# Patient Record
Sex: FEMALE | URBAN - METROPOLITAN AREA
[De-identification: names, ages, dates, MRNs, and addresses within clinical notes are randomized per-mention and may not be internally consistent; named-entity substitution may affect disease eponyms.]

---

## 2024-01-24 ENCOUNTER — HOSPITAL ENCOUNTER (EMERGENCY)
Age: 49
Discharge: HOME | End: 2024-01-24
Attending: EMERGENCY MEDICINE
Payer: COMMERCIAL

## 2024-01-24 VITALS
TEMPERATURE: 97.7 F | DIASTOLIC BLOOD PRESSURE: 84 MMHG | SYSTOLIC BLOOD PRESSURE: 141 MMHG | HEART RATE: 85 BPM | RESPIRATION RATE: 20 BRPM | OXYGEN SATURATION: 94 %

## 2024-01-24 DIAGNOSIS — R19.7 NAUSEA, VOMITING AND DIARRHEA: ICD-10-CM

## 2024-01-24 DIAGNOSIS — R51.9 OCCIPITAL HEADACHE: Primary | ICD-10-CM

## 2024-01-24 DIAGNOSIS — R11.2 NAUSEA, VOMITING AND DIARRHEA: ICD-10-CM

## 2024-01-24 LAB
ALBUMIN SERPL-MCNC: 4.5 G/DL (ref 3.4–5)
ALP SERPL-CCNC: 111 U/L (ref 40–129)
ALT SERPL-CCNC: 37 U/L (ref 10–40)
ANION GAP SERPL CALCULATED.3IONS-SCNC: 13 MMOL/L (ref 3–16)
AST SERPL-CCNC: 30 U/L (ref 15–37)
BACTERIA URNS QL MICRO: ABNORMAL /HPF
BASOPHILS # BLD: 0.1 K/UL (ref 0–0.2)
BASOPHILS NFR BLD: 1.4 %
BILIRUB DIRECT SERPL-MCNC: <0.2 MG/DL (ref 0–0.3)
BILIRUB INDIRECT SERPL-MCNC: NORMAL MG/DL (ref 0–1)
BILIRUB SERPL-MCNC: 0.5 MG/DL (ref 0–1)
BILIRUB UR QL STRIP.AUTO: NEGATIVE
BUN SERPL-MCNC: 12 MG/DL (ref 7–20)
CALCIUM SERPL-MCNC: 9.9 MG/DL (ref 8.3–10.6)
CHLORIDE SERPL-SCNC: 102 MMOL/L (ref 99–110)
CLARITY UR: CLEAR
CO2 SERPL-SCNC: 23 MMOL/L (ref 21–32)
COLOR UR: YELLOW
CREAT SERPL-MCNC: 0.7 MG/DL (ref 0.6–1.1)
DEPRECATED RDW RBC AUTO: 14.1 % (ref 12.4–15.4)
EOSINOPHIL # BLD: 0.3 K/UL (ref 0–0.6)
EOSINOPHIL NFR BLD: 4 %
EPI CELLS #/AREA URNS HPF: ABNORMAL /HPF (ref 0–5)
GFR SERPLBLD CREATININE-BSD FMLA CKD-EPI: >60 ML/MIN/{1.73_M2}
GLUCOSE SERPL-MCNC: 132 MG/DL (ref 70–99)
GLUCOSE UR STRIP.AUTO-MCNC: 500 MG/DL
HCT VFR BLD AUTO: 45 % (ref 36–48)
HGB BLD-MCNC: 15.1 G/DL (ref 12–16)
HGB UR QL STRIP.AUTO: NEGATIVE
KETONES UR STRIP.AUTO-MCNC: NEGATIVE MG/DL
LEUKOCYTE ESTERASE UR QL STRIP.AUTO: NEGATIVE
LIPASE SERPL-CCNC: 46 U/L (ref 13–60)
LYMPHOCYTES # BLD: 1.7 K/UL (ref 1–5.1)
LYMPHOCYTES NFR BLD: 23.8 %
MCH RBC QN AUTO: 29 PG (ref 26–34)
MCHC RBC AUTO-ENTMCNC: 33.5 G/DL (ref 31–36)
MCV RBC AUTO: 86.5 FL (ref 80–100)
MONOCYTES # BLD: 0.4 K/UL (ref 0–1.3)
MONOCYTES NFR BLD: 6.1 %
NEUTROPHILS # BLD: 4.8 K/UL (ref 1.7–7.7)
NEUTROPHILS NFR BLD: 64.7 %
NITRITE UR QL STRIP.AUTO: NEGATIVE
PH UR STRIP.AUTO: 6 [PH] (ref 5–8)
PLATELET # BLD AUTO: 236 K/UL (ref 135–450)
PMV BLD AUTO: 7.6 FL (ref 5–10.5)
POTASSIUM SERPL-SCNC: 4.1 MMOL/L (ref 3.5–5.1)
PROT SERPL-MCNC: 7.2 G/DL (ref 6.4–8.2)
PROT UR STRIP.AUTO-MCNC: ABNORMAL MG/DL
RBC # BLD AUTO: 5.2 M/UL (ref 4–5.2)
RBC #/AREA URNS HPF: ABNORMAL /HPF (ref 0–4)
SODIUM SERPL-SCNC: 138 MMOL/L (ref 136–145)
SP GR UR STRIP.AUTO: 1.02 (ref 1–1.03)
UA DIPSTICK W REFLEX MICRO PNL UR: YES
URN SPEC COLLECT METH UR: ABNORMAL
UROBILINOGEN UR STRIP-ACNC: 0.2 E.U./DL
WBC # BLD AUTO: 7.4 K/UL (ref 4–11)
WBC #/AREA URNS HPF: ABNORMAL /HPF (ref 0–5)

## 2024-01-24 PROCEDURE — 83690 ASSAY OF LIPASE: CPT

## 2024-01-24 PROCEDURE — 96361 HYDRATE IV INFUSION ADD-ON: CPT

## 2024-01-24 PROCEDURE — 6370000000 HC RX 637 (ALT 250 FOR IP): Performed by: PHYSICIAN ASSISTANT

## 2024-01-24 PROCEDURE — 99284 EMERGENCY DEPT VISIT MOD MDM: CPT

## 2024-01-24 PROCEDURE — 96375 TX/PRO/DX INJ NEW DRUG ADDON: CPT

## 2024-01-24 PROCEDURE — 80076 HEPATIC FUNCTION PANEL: CPT

## 2024-01-24 PROCEDURE — 96374 THER/PROPH/DIAG INJ IV PUSH: CPT

## 2024-01-24 PROCEDURE — 80048 BASIC METABOLIC PNL TOTAL CA: CPT

## 2024-01-24 PROCEDURE — 36415 COLL VENOUS BLD VENIPUNCTURE: CPT

## 2024-01-24 PROCEDURE — 6360000002 HC RX W HCPCS: Performed by: PHYSICIAN ASSISTANT

## 2024-01-24 PROCEDURE — 2580000003 HC RX 258: Performed by: PHYSICIAN ASSISTANT

## 2024-01-24 PROCEDURE — 81001 URINALYSIS AUTO W/SCOPE: CPT

## 2024-01-24 PROCEDURE — 85025 COMPLETE CBC W/AUTO DIFF WBC: CPT

## 2024-01-24 RX ORDER — METHYLPHENIDATE HYDROCHLORIDE 10 MG/1
10 TABLET ORAL
COMMUNITY
Start: 2023-11-17

## 2024-01-24 RX ORDER — BUPROPION HYDROCHLORIDE 300 MG/1
300 TABLET ORAL DAILY
COMMUNITY
Start: 2023-10-02

## 2024-01-24 RX ORDER — SODIUM CHLORIDE, SODIUM LACTATE, POTASSIUM CHLORIDE, AND CALCIUM CHLORIDE .6; .31; .03; .02 G/100ML; G/100ML; G/100ML; G/100ML
1000 INJECTION, SOLUTION INTRAVENOUS ONCE
Status: COMPLETED | OUTPATIENT
Start: 2024-01-24 | End: 2024-01-24

## 2024-01-24 RX ORDER — DULAGLUTIDE 4.5 MG/.5ML
4.5 INJECTION, SOLUTION SUBCUTANEOUS WEEKLY
COMMUNITY

## 2024-01-24 RX ORDER — LOSARTAN POTASSIUM 50 MG/1
50 TABLET ORAL DAILY
COMMUNITY

## 2024-01-24 RX ORDER — KETOROLAC TROMETHAMINE 30 MG/ML
15 INJECTION, SOLUTION INTRAMUSCULAR; INTRAVENOUS ONCE
Status: COMPLETED | OUTPATIENT
Start: 2024-01-24 | End: 2024-01-24

## 2024-01-24 RX ORDER — METHOCARBAMOL 500 MG/1
500 TABLET, FILM COATED ORAL 3 TIMES DAILY
COMMUNITY
Start: 2021-12-06

## 2024-01-24 RX ORDER — CLONAZEPAM 1 MG/1
1 TABLET ORAL 2 TIMES DAILY PRN
COMMUNITY
Start: 2023-12-04 | End: 2024-04-02

## 2024-01-24 RX ORDER — OMEPRAZOLE 40 MG/1
40 CAPSULE, DELAYED RELEASE ORAL
COMMUNITY
Start: 2023-10-02

## 2024-01-24 RX ORDER — CYCLOSPORINE 0.5 MG/ML
1 EMULSION OPHTHALMIC EVERY 12 HOURS
COMMUNITY
Start: 2023-12-22

## 2024-01-24 RX ORDER — LABETALOL 200 MG/1
200 TABLET, FILM COATED ORAL 2 TIMES DAILY
COMMUNITY

## 2024-01-24 RX ORDER — PREGABALIN 200 MG/1
200 CAPSULE ORAL 3 TIMES DAILY
COMMUNITY
Start: 2021-11-09 | End: 2024-06-01

## 2024-01-24 RX ORDER — GUANFACINE 3 MG/1
3 TABLET, EXTENDED RELEASE ORAL NIGHTLY
COMMUNITY
Start: 2023-10-04

## 2024-01-24 RX ORDER — ONDANSETRON 2 MG/ML
4 INJECTION INTRAMUSCULAR; INTRAVENOUS ONCE
Status: COMPLETED | OUTPATIENT
Start: 2024-01-24 | End: 2024-01-24

## 2024-01-24 RX ORDER — NYSTATIN 100000 [USP'U]/G
POWDER TOPICAL 3 TIMES DAILY
COMMUNITY
Start: 2021-12-02

## 2024-01-24 RX ORDER — DULOXETIN HYDROCHLORIDE 20 MG/1
20 CAPSULE, DELAYED RELEASE ORAL DAILY
COMMUNITY
End: 2024-01-24 | Stop reason: DRUGHIGH

## 2024-01-24 RX ORDER — CELECOXIB 200 MG/1
200 CAPSULE ORAL 2 TIMES DAILY
COMMUNITY

## 2024-01-24 RX ORDER — ONDANSETRON 8 MG/1
8 TABLET, ORALLY DISINTEGRATING ORAL EVERY 8 HOURS PRN
Qty: 6 TABLET | Refills: 0 | Status: SHIPPED | OUTPATIENT
Start: 2024-01-24

## 2024-01-24 RX ORDER — ATORVASTATIN CALCIUM 20 MG/1
20 TABLET, FILM COATED ORAL DAILY
COMMUNITY

## 2024-01-24 RX ORDER — AMOXICILLIN AND CLAVULANATE POTASSIUM 875; 125 MG/1; MG/1
1 TABLET, FILM COATED ORAL 2 TIMES DAILY
COMMUNITY

## 2024-01-24 RX ORDER — DULOXETIN HYDROCHLORIDE 60 MG/1
60 CAPSULE, DELAYED RELEASE ORAL 2 TIMES DAILY
COMMUNITY
Start: 2024-01-09

## 2024-01-24 RX ORDER — METHYLPHENIDATE HYDROCHLORIDE 18 MG/1
27 TABLET ORAL EVERY MORNING
COMMUNITY
End: 2024-01-24 | Stop reason: DRUGHIGH

## 2024-01-24 RX ORDER — BUTALBITAL, ACETAMINOPHEN AND CAFFEINE 50; 325; 40 MG/1; MG/1; MG/1
2 TABLET ORAL ONCE
Status: COMPLETED | OUTPATIENT
Start: 2024-01-24 | End: 2024-01-24

## 2024-01-24 RX ORDER — METHYLPHENIDATE HYDROCHLORIDE 27 MG/1
27 TABLET ORAL EVERY MORNING
COMMUNITY

## 2024-01-24 RX ORDER — RAMELTEON 8 MG/1
8 TABLET ORAL NIGHTLY
COMMUNITY
Start: 2021-12-03

## 2024-01-24 RX ORDER — METFORMIN HYDROCHLORIDE 500 MG/1
500 TABLET, EXTENDED RELEASE ORAL
COMMUNITY
Start: 2023-12-11

## 2024-01-24 RX ORDER — MONTELUKAST SODIUM 10 MG/1
10 TABLET ORAL NIGHTLY
COMMUNITY
Start: 2023-10-02

## 2024-01-24 RX ORDER — PREGABALIN 150 MG/1
150 CAPSULE ORAL 2 TIMES DAILY
COMMUNITY
End: 2024-01-24 | Stop reason: DRUGHIGH

## 2024-01-24 RX ORDER — TRAZODONE HYDROCHLORIDE 100 MG/1
100 TABLET ORAL NIGHTLY
COMMUNITY

## 2024-01-24 RX ADMIN — ONDANSETRON 4 MG: 2 INJECTION INTRAMUSCULAR; INTRAVENOUS at 15:02

## 2024-01-24 RX ADMIN — KETOROLAC TROMETHAMINE 15 MG: 30 INJECTION, SOLUTION INTRAMUSCULAR; INTRAVENOUS at 15:03

## 2024-01-24 RX ADMIN — SODIUM CHLORIDE, SODIUM LACTATE, POTASSIUM CHLORIDE, AND CALCIUM CHLORIDE 1000 ML: .6; .31; .03; .02 INJECTION, SOLUTION INTRAVENOUS at 15:02

## 2024-01-24 RX ADMIN — BUTALBITAL, ACETAMINOPHEN, AND CAFFEINE 2 TABLET: 50; 325; 40 TABLET ORAL at 16:12

## 2024-01-24 ASSESSMENT — ENCOUNTER SYMPTOMS
SHORTNESS OF BREATH: 0
VOMITING: 1
DIARRHEA: 1
COUGH: 0
ABDOMINAL PAIN: 0
NAUSEA: 1
COLOR CHANGE: 0

## 2024-01-24 ASSESSMENT — PAIN DESCRIPTION - LOCATION: LOCATION: HEAD

## 2024-01-24 ASSESSMENT — PAIN SCALES - GENERAL: PAINLEVEL_OUTOF10: 10

## 2024-01-24 ASSESSMENT — PAIN - FUNCTIONAL ASSESSMENT: PAIN_FUNCTIONAL_ASSESSMENT: 0-10

## 2024-01-24 NOTE — DISCHARGE INSTRUCTIONS
Take an over-the-counter probiotic to prevent diarrhea that can be caused from the antibiotic.  Drink at least 2 to 3 L of water daily for adequate hydration.

## 2024-01-24 NOTE — ED PROVIDER NOTES
ED Attending Attestation Note     Date of evaluation: 1/24/2024    This patient was seen by the advance practice provider.  I have seen and examined the patient, agree with the workup, evaluation, management and diagnosis. The care plan has been discussed.  My assessment reveals patient had has a towel over her face to block the light, light hurts her eyes anicteric worse    Chief complaint--headache    HPI--headache for about a week that comes and goes, positive photophobia, positive nausea    PMH--migraine headaches     Duncan Burnham MD  01/24/24 2797    
eye in the morning and 1 drop in the evening.    TRAZODONE (DESYREL) 100 MG TABLET    Take 1 tablet by mouth nightly       Allergies     She is allergic to lisinopril.    Physical Exam     INITIAL VITALS: BP: (!) 186/105, Temp: 97.7 °F (36.5 °C), Pulse: (!) 101, Respirations: 20, SpO2: 94 %  Physical Exam  Vitals reviewed.   Constitutional:       General: She is not in acute distress.     Appearance: She is well-developed. She is obese.   HENT:      Head: Normocephalic and atraumatic.      Mouth/Throat:      Mouth: Mucous membranes are moist.   Neck:      Trachea: Phonation normal.      Meningeal: Brudzinski's sign and Kernig's sign absent.   Cardiovascular:      Rate and Rhythm: Regular rhythm. Tachycardia present.      Heart sounds: No murmur heard.     No friction rub. No gallop.   Pulmonary:      Effort: Pulmonary effort is normal. No respiratory distress.      Breath sounds: No wheezing, rhonchi or rales.   Abdominal:      General: There is no distension.      Palpations: Abdomen is soft.      Tenderness: There is no abdominal tenderness.   Musculoskeletal:      Cervical back: Normal range of motion and neck supple.   Skin:     General: Skin is warm and dry.      Findings: No petechiae or rash.   Neurological:      Mental Status: She is alert and oriented to person, place, and time.      Cranial Nerves: Cranial nerves 2-12 are intact.      Motor: Motor function is intact.   Psychiatric:         Mood and Affect: Mood and affect normal.         Behavior: Behavior normal.           Mariama Paris PA  01/24/24 8324

## 2024-01-24 NOTE — ED TRIAGE NOTES
Pt c/o headache x1 week that comes and goes, +photophobia. 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

## 2024-02-11 ENCOUNTER — APPOINTMENT (OUTPATIENT)
Dept: CT IMAGING | Age: 49
DRG: 062 | End: 2024-02-11
Payer: COMMERCIAL

## 2024-02-11 ENCOUNTER — APPOINTMENT (OUTPATIENT)
Dept: GENERAL RADIOLOGY | Age: 49
DRG: 062 | End: 2024-02-11
Payer: COMMERCIAL

## 2024-02-11 ENCOUNTER — HOSPITAL ENCOUNTER (INPATIENT)
Age: 49
LOS: 4 days | Discharge: PSYCHIATRIC OR SUBSTANCE USE FACILITY | DRG: 062 | End: 2024-02-15
Attending: STUDENT IN AN ORGANIZED HEALTH CARE EDUCATION/TRAINING PROGRAM | Admitting: STUDENT IN AN ORGANIZED HEALTH CARE EDUCATION/TRAINING PROGRAM
Payer: COMMERCIAL

## 2024-02-11 DIAGNOSIS — R42 DIZZINESS: Primary | ICD-10-CM

## 2024-02-11 DIAGNOSIS — H53.2 DIPLOPIA: ICD-10-CM

## 2024-02-11 DIAGNOSIS — R29.90 STROKE-LIKE SYMPTOM: ICD-10-CM

## 2024-02-11 DIAGNOSIS — R26.81 UNSTEADY GAIT: ICD-10-CM

## 2024-02-11 PROBLEM — F80.81 STUTTERING: Status: ACTIVE | Noted: 2024-02-11

## 2024-02-11 PROBLEM — R47.1 DYSARTHRIA: Status: ACTIVE | Noted: 2024-02-11

## 2024-02-11 PROBLEM — I63.9 ACUTE CVA (CEREBROVASCULAR ACCIDENT) (HCC): Status: ACTIVE | Noted: 2024-02-11

## 2024-02-11 PROBLEM — I63.9 STROKE WITH CEREBRAL ISCHEMIA (HCC): Status: ACTIVE | Noted: 2024-02-11

## 2024-02-11 LAB
ALBUMIN SERPL-MCNC: 4.2 G/DL (ref 3.4–5)
ALBUMIN/GLOB SERPL: 1.8 {RATIO} (ref 1.1–2.2)
ANION GAP SERPL CALCULATED.3IONS-SCNC: 10 MMOL/L (ref 3–16)
AST SERPL-CCNC: 24 U/L (ref 15–37)
BASOPHILS # BLD: 0.2 K/UL (ref 0–0.2)
BASOPHILS NFR BLD: 2.1 %
BILIRUB SERPL-MCNC: 1.2 MG/DL (ref 0–1)
BUN SERPL-MCNC: 17 MG/DL (ref 7–20)
CALCIUM SERPL-MCNC: 8.7 MG/DL (ref 8.3–10.6)
CHLORIDE SERPL-SCNC: 102 MMOL/L (ref 99–110)
CHOLEST SERPL-MCNC: 170 MG/DL (ref 0–199)
CO2 SERPL-SCNC: 27 MMOL/L (ref 21–32)
CREAT SERPL-MCNC: 0.9 MG/DL (ref 0.6–1.1)
DEPRECATED RDW RBC AUTO: 13.9 % (ref 12.4–15.4)
EOSINOPHIL # BLD: 0.3 K/UL (ref 0–0.6)
EOSINOPHIL NFR BLD: 3.7 %
GFR SERPLBLD CREATININE-BSD FMLA CKD-EPI: >60 ML/MIN/{1.73_M2}
GLUCOSE BLD-MCNC: 108 MG/DL (ref 70–99)
GLUCOSE BLD-MCNC: 120 MG/DL (ref 70–99)
GLUCOSE BLD-MCNC: 133 MG/DL (ref 70–99)
GLUCOSE SERPL-MCNC: 112 MG/DL (ref 70–99)
HCT VFR BLD AUTO: 39.6 % (ref 36–48)
HDLC SERPL-MCNC: 45 MG/DL (ref 40–60)
HGB BLD-MCNC: 13.2 G/DL (ref 12–16)
INR PPP: 0.96 (ref 0.84–1.16)
LDLC SERPL CALC-MCNC: 97 MG/DL
LYMPHOCYTES # BLD: 1.2 K/UL (ref 1–5.1)
LYMPHOCYTES NFR BLD: 15.2 %
MCH RBC QN AUTO: 28.5 PG (ref 26–34)
MCHC RBC AUTO-ENTMCNC: 33.5 G/DL (ref 31–36)
MCV RBC AUTO: 85 FL (ref 80–100)
MONOCYTES # BLD: 0.9 K/UL (ref 0–1.3)
MONOCYTES NFR BLD: 10.7 %
NEUTROPHILS # BLD: 5.5 K/UL (ref 1.7–7.7)
NEUTROPHILS NFR BLD: 68.3 %
PERFORMED ON: ABNORMAL
PLATELET # BLD AUTO: 181 K/UL (ref 135–450)
PMV BLD AUTO: 7.8 FL (ref 5–10.5)
POTASSIUM SERPL-SCNC: 4 MMOL/L (ref 3.5–5.1)
PROT SERPL-MCNC: 6.5 G/DL (ref 6.4–8.2)
PROTHROMBIN TIME: 12.8 SEC (ref 11.5–14.8)
RBC # BLD AUTO: 4.65 M/UL (ref 4–5.2)
SODIUM SERPL-SCNC: 139 MMOL/L (ref 136–145)
TRIGL SERPL-MCNC: 139 MG/DL (ref 0–150)
TROPONIN, HIGH SENSITIVITY: 12 NG/L (ref 0–14)
TROPONIN, HIGH SENSITIVITY: 18 NG/L (ref 0–14)
VLDLC SERPL CALC-MCNC: 28 MG/DL
WBC # BLD AUTO: 8 K/UL (ref 4–11)

## 2024-02-11 PROCEDURE — 85025 COMPLETE CBC W/AUTO DIFF WBC: CPT

## 2024-02-11 PROCEDURE — 93005 ELECTROCARDIOGRAM TRACING: CPT | Performed by: STUDENT IN AN ORGANIZED HEALTH CARE EDUCATION/TRAINING PROGRAM

## 2024-02-11 PROCEDURE — 6370000000 HC RX 637 (ALT 250 FOR IP)

## 2024-02-11 PROCEDURE — 99223 1ST HOSP IP/OBS HIGH 75: CPT | Performed by: PSYCHIATRY & NEUROLOGY

## 2024-02-11 PROCEDURE — 36415 COLL VENOUS BLD VENIPUNCTURE: CPT

## 2024-02-11 PROCEDURE — 6360000002 HC RX W HCPCS

## 2024-02-11 PROCEDURE — 84484 ASSAY OF TROPONIN QUANT: CPT

## 2024-02-11 PROCEDURE — 2000000000 HC ICU R&B

## 2024-02-11 PROCEDURE — 80053 COMPREHEN METABOLIC PANEL: CPT

## 2024-02-11 PROCEDURE — 2580000003 HC RX 258: Performed by: STUDENT IN AN ORGANIZED HEALTH CARE EDUCATION/TRAINING PROGRAM

## 2024-02-11 PROCEDURE — 71045 X-RAY EXAM CHEST 1 VIEW: CPT

## 2024-02-11 PROCEDURE — 83036 HEMOGLOBIN GLYCOSYLATED A1C: CPT

## 2024-02-11 PROCEDURE — 99285 EMERGENCY DEPT VISIT HI MDM: CPT

## 2024-02-11 PROCEDURE — 70496 CT ANGIOGRAPHY HEAD: CPT

## 2024-02-11 PROCEDURE — 70450 CT HEAD/BRAIN W/O DYE: CPT

## 2024-02-11 PROCEDURE — 6360000004 HC RX CONTRAST MEDICATION: Performed by: STUDENT IN AN ORGANIZED HEALTH CARE EDUCATION/TRAINING PROGRAM

## 2024-02-11 PROCEDURE — 80061 LIPID PANEL: CPT

## 2024-02-11 PROCEDURE — 85610 PROTHROMBIN TIME: CPT

## 2024-02-11 PROCEDURE — 96374 THER/PROPH/DIAG INJ IV PUSH: CPT

## 2024-02-11 PROCEDURE — APPNB45 APP NON BILLABLE 31-45 MINUTES

## 2024-02-11 PROCEDURE — 3E03317 INTRODUCTION OF OTHER THROMBOLYTIC INTO PERIPHERAL VEIN, PERCUTANEOUS APPROACH: ICD-10-PCS | Performed by: STUDENT IN AN ORGANIZED HEALTH CARE EDUCATION/TRAINING PROGRAM

## 2024-02-11 RX ORDER — MONTELUKAST SODIUM 10 MG/1
10 TABLET ORAL NIGHTLY
Status: DISCONTINUED | OUTPATIENT
Start: 2024-02-11 | End: 2024-02-15 | Stop reason: HOSPADM

## 2024-02-11 RX ORDER — TRAZODONE HYDROCHLORIDE 100 MG/1
100 TABLET ORAL NIGHTLY
Status: DISCONTINUED | OUTPATIENT
Start: 2024-02-11 | End: 2024-02-11

## 2024-02-11 RX ORDER — DULOXETIN HYDROCHLORIDE 60 MG/1
60 CAPSULE, DELAYED RELEASE ORAL 2 TIMES DAILY
Status: DISCONTINUED | OUTPATIENT
Start: 2024-02-11 | End: 2024-02-15 | Stop reason: HOSPADM

## 2024-02-11 RX ORDER — LABETALOL 200 MG/1
200 TABLET, FILM COATED ORAL 2 TIMES DAILY
Status: DISCONTINUED | OUTPATIENT
Start: 2024-02-11 | End: 2024-02-11

## 2024-02-11 RX ORDER — PANTOPRAZOLE SODIUM 40 MG/1
40 TABLET, DELAYED RELEASE ORAL
Status: DISCONTINUED | OUTPATIENT
Start: 2024-02-12 | End: 2024-02-15 | Stop reason: HOSPADM

## 2024-02-11 RX ORDER — ONDANSETRON 4 MG/1
8 TABLET, ORALLY DISINTEGRATING ORAL EVERY 8 HOURS PRN
Status: DISCONTINUED | OUTPATIENT
Start: 2024-02-11 | End: 2024-02-15 | Stop reason: HOSPADM

## 2024-02-11 RX ORDER — ATORVASTATIN CALCIUM 80 MG/1
80 TABLET, FILM COATED ORAL NIGHTLY
Status: DISCONTINUED | OUTPATIENT
Start: 2024-02-11 | End: 2024-02-15 | Stop reason: HOSPADM

## 2024-02-11 RX ORDER — CLONAZEPAM 1 MG/1
1 TABLET ORAL EVERY 12 HOURS
Status: DISCONTINUED | OUTPATIENT
Start: 2024-02-12 | End: 2024-02-15 | Stop reason: HOSPADM

## 2024-02-11 RX ORDER — GLUCAGON 1 MG/ML
1 KIT INJECTION PRN
Status: DISCONTINUED | OUTPATIENT
Start: 2024-02-11 | End: 2024-02-15 | Stop reason: HOSPADM

## 2024-02-11 RX ORDER — SODIUM CHLORIDE 0.9 % (FLUSH) 0.9 %
10 SYRINGE (ML) INJECTION ONCE
Status: COMPLETED | OUTPATIENT
Start: 2024-02-11 | End: 2024-02-11

## 2024-02-11 RX ORDER — INSULIN LISPRO 100 [IU]/ML
0-4 INJECTION, SOLUTION INTRAVENOUS; SUBCUTANEOUS
Status: DISCONTINUED | OUTPATIENT
Start: 2024-02-11 | End: 2024-02-15

## 2024-02-11 RX ORDER — CLONAZEPAM 1 MG/1
1 TABLET ORAL DAILY
Status: DISCONTINUED | OUTPATIENT
Start: 2024-02-11 | End: 2024-02-11

## 2024-02-11 RX ORDER — BUPROPION HYDROCHLORIDE 150 MG/1
300 TABLET ORAL DAILY
Status: DISCONTINUED | OUTPATIENT
Start: 2024-02-12 | End: 2024-02-15 | Stop reason: HOSPADM

## 2024-02-11 RX ORDER — ATORVASTATIN CALCIUM 40 MG/1
40 TABLET, FILM COATED ORAL NIGHTLY
Status: DISCONTINUED | OUTPATIENT
Start: 2024-02-11 | End: 2024-02-11

## 2024-02-11 RX ORDER — INSULIN LISPRO 100 [IU]/ML
0-4 INJECTION, SOLUTION INTRAVENOUS; SUBCUTANEOUS NIGHTLY
Status: DISCONTINUED | OUTPATIENT
Start: 2024-02-11 | End: 2024-02-15

## 2024-02-11 RX ORDER — DEXTROSE MONOHYDRATE 100 MG/ML
INJECTION, SOLUTION INTRAVENOUS CONTINUOUS PRN
Status: DISCONTINUED | OUTPATIENT
Start: 2024-02-11 | End: 2024-02-15 | Stop reason: HOSPADM

## 2024-02-11 RX ORDER — SODIUM CHLORIDE 0.9 % (FLUSH) 0.9 %
5-40 SYRINGE (ML) INJECTION PRN
Status: DISCONTINUED | OUTPATIENT
Start: 2024-02-11 | End: 2024-02-15 | Stop reason: HOSPADM

## 2024-02-11 RX ORDER — 0.9 % SODIUM CHLORIDE 0.9 %
1000 INTRAVENOUS SOLUTION INTRAVENOUS ONCE
Status: COMPLETED | OUTPATIENT
Start: 2024-02-11 | End: 2024-02-11

## 2024-02-11 RX ORDER — SODIUM CHLORIDE 9 MG/ML
INJECTION, SOLUTION INTRAVENOUS PRN
Status: DISCONTINUED | OUTPATIENT
Start: 2024-02-11 | End: 2024-02-15 | Stop reason: HOSPADM

## 2024-02-11 RX ORDER — CELECOXIB 100 MG/1
200 CAPSULE ORAL 2 TIMES DAILY
Status: DISCONTINUED | OUTPATIENT
Start: 2024-02-11 | End: 2024-02-11

## 2024-02-11 RX ORDER — SODIUM CHLORIDE 0.9 % (FLUSH) 0.9 %
5-40 SYRINGE (ML) INJECTION EVERY 12 HOURS SCHEDULED
Status: DISCONTINUED | OUTPATIENT
Start: 2024-02-11 | End: 2024-02-15 | Stop reason: HOSPADM

## 2024-02-11 RX ORDER — DEXTROMETHORPHAN HYDROBROMIDE AND PROMETHAZINE HYDROCHLORIDE 15; 6.25 MG/5ML; MG/5ML
5 SYRUP ORAL 4 TIMES DAILY PRN
COMMUNITY

## 2024-02-11 RX ORDER — LANOLIN ALCOHOL/MO/W.PET/CERES
6 CREAM (GRAM) TOPICAL NIGHTLY PRN
Status: DISCONTINUED | OUTPATIENT
Start: 2024-02-11 | End: 2024-02-15 | Stop reason: HOSPADM

## 2024-02-11 RX ADMIN — ATORVASTATIN CALCIUM 80 MG: 80 TABLET, FILM COATED ORAL at 21:18

## 2024-02-11 RX ADMIN — SODIUM CHLORIDE, PRESERVATIVE FREE 10 ML: 5 INJECTION INTRAVENOUS at 22:32

## 2024-02-11 RX ADMIN — Medication 1 LOZENGE: at 21:18

## 2024-02-11 RX ADMIN — TENECTEPLASE 25 MG: KIT at 11:55

## 2024-02-11 RX ADMIN — SODIUM CHLORIDE 1000 ML: 9 INJECTION, SOLUTION INTRAVENOUS at 11:53

## 2024-02-11 RX ADMIN — MONTELUKAST 10 MG: 10 TABLET, FILM COATED ORAL at 21:18

## 2024-02-11 RX ADMIN — PREGABALIN 200 MG: 150 CAPSULE ORAL at 21:18

## 2024-02-11 RX ADMIN — DULOXETINE HYDROCHLORIDE 60 MG: 60 CAPSULE, DELAYED RELEASE ORAL at 21:18

## 2024-02-11 RX ADMIN — SODIUM CHLORIDE, PRESERVATIVE FREE 10 ML: 5 INJECTION INTRAVENOUS at 11:54

## 2024-02-11 RX ADMIN — PREGABALIN 200 MG: 150 CAPSULE ORAL at 15:55

## 2024-02-11 RX ADMIN — IOPAMIDOL 75 ML: 755 INJECTION, SOLUTION INTRAVENOUS at 11:30

## 2024-02-11 RX ADMIN — SODIUM CHLORIDE, PRESERVATIVE FREE 10 ML: 5 INJECTION INTRAVENOUS at 11:57

## 2024-02-11 NOTE — CONSULTS
Chart reviewed, events noted, and I have personally performed a face-to-face diagnostic evaluation on this patient, including physical examination. I have personally reviewed CNP's note and confirmed the documented past medical history, family history, social history, allergies, home medications, review of systems, vital signs, laboratory values, and hospital medications via my own chart review, in person with the patient, and/or in person with her family members as appropriate. My findings are as follows:    47yo woman with ADHD; anxiety; fibromyalgia; PTSD; chronic pain; chondromalacia; DM; HLD; HTN; OA. Presented to ER with 'dizziness' and diplopia.    She reports that when she woke this morning and sat up on bed she felt \"dizzy.\" She describes the feeling as lightheaded but then, in retrospect, feels like there was a spinning sensation. She had a hard time getting dressed because of this which made her balance off. 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 Once here she felt that she was having double vision in just her right eye. She also felt that her \"dizzy\" feeling was worsening. In the ER she was complaining of feeling off balance and lightheaded. She was noted to have some speech abnormalities in the ER as well but is unable to say when that began. When attempts to ambulate her were made in the ER, it was noted that she could not take a step because of it. BP was 103/62 and SBP remained in the 90-100s range. Head CT and CTA head and neck were unremarkable. Stroke team was contacted and decision was make to give TNK which was adminstered at 11:55 this morning.    Since then she feels that she is improving. She is still having double vision out of her right eye and is still not speaking back to normal.     On exam, at 15:50 this afternoon (~4 hours since TNK administration) she has no dysconjugate gaze. Visual fields are full. She reports some mild double vision when

## 2024-02-11 NOTE — H&P
Intake/Output Summary (Last 24 hours) at 2/11/2024 1312  Last data filed at 2/11/2024 1157  Gross per 24 hour   Intake 20 ml   Output --   Net 20 ml     I/O this shift:  In: 20 [I.V.:20]  Out: -   No intake/output data recorded.    Physical Examination:     Physical Exam  Constitutional:       General: She is not in acute distress.     Appearance: She is not ill-appearing.   HENT:      Head: Normocephalic and atraumatic.      Mouth/Throat:      Mouth: Mucous membranes are dry.      Pharynx: Oropharynx is clear.   Eyes:      Extraocular Movements: Extraocular movements intact.      Conjunctiva/sclera: Conjunctivae normal.      Pupils: Pupils are equal, round, and reactive to light.   Cardiovascular:      Rate and Rhythm: Normal rate.      Pulses: Normal pulses.   Pulmonary:      Effort: Pulmonary effort is normal.      Breath sounds: Normal breath sounds. No wheezing.   Abdominal:      General: There is no distension.      Palpations: Abdomen is soft.      Tenderness: There is no abdominal tenderness. There is no guarding.   Musculoskeletal:         General: No swelling.      Right lower leg: No edema.      Left lower leg: No edema.   Skin:     General: Skin is warm and dry.   Neurological:      Mental Status: She is alert and oriented to person, place, and time.      Motor: No weakness.      Comments: Cranial nerves intact, no sensory deficits, 5/5 strength in all 4 extremities. Pt Aox4 and able to follow commands       No results for input(s): \"PHART\", \"WOX4GII\", \"PO2ART\" in the last 72 hours.        DATA:       Labs:  CBC:   Recent Labs     02/11/24  1130   WBC 8.0   HGB 13.2   HCT 39.6          BMP:   Recent Labs     02/11/24  1130      K 4.0      CO2 27   BUN 17   CREATININE 0.9   GLUCOSE 112*     LFT's:   Recent Labs     02/11/24  1130   AST 24   ALT 31   BILITOT 1.2*   ALKPHOS 99     Troponin: No results for input(s): \"TROPONINI\" in the last 72 hours.  BNP:No results for input(s): \"BNP\" in

## 2024-02-11 NOTE — ED NOTES
Report called to J[facility] Nieves, ICU. Patient ready for transport to ICU 4504.     Liane Wesley RN  02/11/24 8495

## 2024-02-11 NOTE — ED PROVIDER NOTES
infarction in past 3 months  [] Stroke, intracranial surgery or serious head trauma in past 3 months    Thrombolytics given with the following INCLUSION CRITERIA verified (only those checked):  [] Age 18 years or older  [] Clinical diagnosis of ischemic stroke causing measurable neurological deficit  [] Administration of thrombolytics can be initiated within 4.5 hours of onset of symptoms  [] A patient or family members who understand the potential risks and benefits:   Of every 100 patients treated with thrombolytics:   65 will have the same outcome   32 will have a better outcome   3 will have a worse outcome (with 1 being severely disabled or fatal) due to thrombolytic administration    Acute Stroke Core Measures:   Last Known Well: 0800  NIH Stroke Scale Total: 2  Thrombolytic Eligibility: IV TNK was administered as patient met eligibility requirements    TNK given at 1154    Critical Care:  Due to the immediate potential for life-threatening deterioration due to suspected stroke, I spent 40 minutes providing critical care.This time excludes time spent performing procedures but includes time spent on direct patient care, history retrieval, review of the chart, and discussions with patient, family, and consultant(s).    Clinical Impression     1. Dizziness    2. Diplopia    3. Unsteady gait        Disposition     PATIENT REFERRED TO:  No follow-up provider specified.    DISCHARGE MEDICATIONS:  New Prescriptions    No medications on file       DISPOSITION Decision To Admit 02/11/2024 11:51:10 AM       Chino Tripathi MD  02/11/24 1210

## 2024-02-12 ENCOUNTER — APPOINTMENT (OUTPATIENT)
Dept: CT IMAGING | Age: 49
DRG: 062 | End: 2024-02-12
Payer: COMMERCIAL

## 2024-02-12 ENCOUNTER — APPOINTMENT (OUTPATIENT)
Dept: MRI IMAGING | Age: 49
DRG: 062 | End: 2024-02-12
Payer: COMMERCIAL

## 2024-02-12 PROBLEM — R42 DIZZINESS: Status: ACTIVE | Noted: 2024-02-12

## 2024-02-12 LAB
ALBUMIN SERPL-MCNC: 3.7 G/DL (ref 3.4–5)
ALP SERPL-CCNC: 113 U/L (ref 40–129)
ALT SERPL-CCNC: 28 U/L (ref 10–40)
ANION GAP SERPL CALCULATED.3IONS-SCNC: 9 MMOL/L (ref 3–16)
AST SERPL-CCNC: 19 U/L (ref 15–37)
BASOPHILS # BLD: 0.1 K/UL (ref 0–0.2)
BASOPHILS NFR BLD: 1.3 %
BILIRUB DIRECT SERPL-MCNC: <0.2 MG/DL (ref 0–0.3)
BILIRUB INDIRECT SERPL-MCNC: ABNORMAL MG/DL (ref 0–1)
BILIRUB SERPL-MCNC: 0.6 MG/DL (ref 0–1)
BUN SERPL-MCNC: 11 MG/DL (ref 7–20)
CALCIUM SERPL-MCNC: 8.1 MG/DL (ref 8.3–10.6)
CHLORIDE SERPL-SCNC: 104 MMOL/L (ref 99–110)
CO2 SERPL-SCNC: 23 MMOL/L (ref 21–32)
CREAT SERPL-MCNC: 0.7 MG/DL (ref 0.6–1.1)
DEPRECATED RDW RBC AUTO: 13.9 % (ref 12.4–15.4)
EKG ATRIAL RATE: 80 BPM
EKG DIAGNOSIS: NORMAL
EKG P AXIS: 39 DEGREES
EKG P-R INTERVAL: 168 MS
EKG Q-T INTERVAL: 384 MS
EKG QRS DURATION: 86 MS
EKG QTC CALCULATION (BAZETT): 442 MS
EKG R AXIS: -15 DEGREES
EKG T AXIS: 33 DEGREES
EKG VENTRICULAR RATE: 80 BPM
EOSINOPHIL # BLD: 0.4 K/UL (ref 0–0.6)
EOSINOPHIL NFR BLD: 5.2 %
EST. AVERAGE GLUCOSE BLD GHB EST-MCNC: 139.9 MG/DL
GFR SERPLBLD CREATININE-BSD FMLA CKD-EPI: >60 ML/MIN/{1.73_M2}
GLUCOSE BLD-MCNC: 150 MG/DL (ref 70–99)
GLUCOSE BLD-MCNC: 189 MG/DL (ref 70–99)
GLUCOSE BLD-MCNC: 198 MG/DL (ref 70–99)
GLUCOSE BLD-MCNC: 199 MG/DL (ref 70–99)
GLUCOSE SERPL-MCNC: 166 MG/DL (ref 70–99)
HBA1C MFR BLD: 6.5 %
HCT VFR BLD AUTO: 35.9 % (ref 36–48)
HGB BLD-MCNC: 12.3 G/DL (ref 12–16)
LYMPHOCYTES # BLD: 1.4 K/UL (ref 1–5.1)
LYMPHOCYTES NFR BLD: 19.1 %
MAGNESIUM SERPL-MCNC: 2.1 MG/DL (ref 1.8–2.4)
MCH RBC QN AUTO: 29.3 PG (ref 26–34)
MCHC RBC AUTO-ENTMCNC: 34.4 G/DL (ref 31–36)
MCV RBC AUTO: 85.3 FL (ref 80–100)
MONOCYTES # BLD: 0.6 K/UL (ref 0–1.3)
MONOCYTES NFR BLD: 8 %
NEUTROPHILS # BLD: 4.8 K/UL (ref 1.7–7.7)
NEUTROPHILS NFR BLD: 66.4 %
PERFORMED ON: ABNORMAL
PHOSPHATE SERPL-MCNC: 3 MG/DL (ref 2.5–4.9)
PLATELET # BLD AUTO: 163 K/UL (ref 135–450)
PMV BLD AUTO: 8 FL (ref 5–10.5)
POTASSIUM SERPL-SCNC: 3.7 MMOL/L (ref 3.5–5.1)
PROT SERPL-MCNC: 5.9 G/DL (ref 6.4–8.2)
RBC # BLD AUTO: 4.21 M/UL (ref 4–5.2)
SODIUM SERPL-SCNC: 136 MMOL/L (ref 136–145)
WBC # BLD AUTO: 7.2 K/UL (ref 4–11)

## 2024-02-12 PROCEDURE — APPNB45 APP NON BILLABLE 31-45 MINUTES: Performed by: NURSE PRACTITIONER

## 2024-02-12 PROCEDURE — 6370000000 HC RX 637 (ALT 250 FOR IP)

## 2024-02-12 PROCEDURE — 99222 1ST HOSP IP/OBS MODERATE 55: CPT | Performed by: INTERNAL MEDICINE

## 2024-02-12 PROCEDURE — 85025 COMPLETE CBC W/AUTO DIFF WBC: CPT

## 2024-02-12 PROCEDURE — 2580000003 HC RX 258: Performed by: STUDENT IN AN ORGANIZED HEALTH CARE EDUCATION/TRAINING PROGRAM

## 2024-02-12 PROCEDURE — 83735 ASSAY OF MAGNESIUM: CPT

## 2024-02-12 PROCEDURE — 80069 RENAL FUNCTION PANEL: CPT

## 2024-02-12 PROCEDURE — 2580000003 HC RX 258

## 2024-02-12 PROCEDURE — 1200000000 HC SEMI PRIVATE

## 2024-02-12 PROCEDURE — 70551 MRI BRAIN STEM W/O DYE: CPT

## 2024-02-12 PROCEDURE — 70450 CT HEAD/BRAIN W/O DYE: CPT

## 2024-02-12 PROCEDURE — 80076 HEPATIC FUNCTION PANEL: CPT

## 2024-02-12 PROCEDURE — 36415 COLL VENOUS BLD VENIPUNCTURE: CPT

## 2024-02-12 PROCEDURE — 6370000000 HC RX 637 (ALT 250 FOR IP): Performed by: NURSE PRACTITIONER

## 2024-02-12 RX ORDER — ASPIRIN 81 MG/1
81 TABLET, CHEWABLE ORAL DAILY
Status: DISCONTINUED | OUTPATIENT
Start: 2024-02-12 | End: 2024-02-15 | Stop reason: HOSPADM

## 2024-02-12 RX ORDER — ACETAMINOPHEN 325 MG/1
650 TABLET ORAL EVERY 4 HOURS PRN
Status: DISCONTINUED | OUTPATIENT
Start: 2024-02-12 | End: 2024-02-15 | Stop reason: HOSPADM

## 2024-02-12 RX ORDER — LOSARTAN POTASSIUM 50 MG/1
50 TABLET ORAL DAILY
Status: DISCONTINUED | OUTPATIENT
Start: 2024-02-12 | End: 2024-02-15 | Stop reason: HOSPADM

## 2024-02-12 RX ORDER — POLYVINYL ALCOHOL 14 MG/ML
2 SOLUTION/ DROPS OPHTHALMIC PRN
Status: DISCONTINUED | OUTPATIENT
Start: 2024-02-12 | End: 2024-02-15 | Stop reason: HOSPADM

## 2024-02-12 RX ORDER — LABETALOL 200 MG/1
200 TABLET, FILM COATED ORAL 2 TIMES DAILY
Status: DISCONTINUED | OUTPATIENT
Start: 2024-02-12 | End: 2024-02-15 | Stop reason: HOSPADM

## 2024-02-12 RX ORDER — GUAIFENESIN 600 MG/1
600 TABLET, EXTENDED RELEASE ORAL 2 TIMES DAILY
Status: DISCONTINUED | OUTPATIENT
Start: 2024-02-12 | End: 2024-02-15 | Stop reason: HOSPADM

## 2024-02-12 RX ADMIN — POLYVINYL ALCOHOL 2 DROP: 14 SOLUTION/ DROPS OPHTHALMIC at 20:21

## 2024-02-12 RX ADMIN — CLONAZEPAM 1 MG: 1 TABLET ORAL at 01:00

## 2024-02-12 RX ADMIN — SODIUM CHLORIDE, PRESERVATIVE FREE 10 ML: 5 INJECTION INTRAVENOUS at 20:10

## 2024-02-12 RX ADMIN — ACETAMINOPHEN 650 MG: 325 TABLET ORAL at 23:08

## 2024-02-12 RX ADMIN — Medication 1 LOZENGE: at 00:59

## 2024-02-12 RX ADMIN — DULOXETINE HYDROCHLORIDE 60 MG: 60 CAPSULE, DELAYED RELEASE ORAL at 20:11

## 2024-02-12 RX ADMIN — Medication 1 LOZENGE: at 06:14

## 2024-02-12 RX ADMIN — PREGABALIN 200 MG: 150 CAPSULE ORAL at 08:40

## 2024-02-12 RX ADMIN — GUAIFENESIN 600 MG: 600 TABLET ORAL at 20:12

## 2024-02-12 RX ADMIN — DULOXETINE HYDROCHLORIDE 60 MG: 60 CAPSULE, DELAYED RELEASE ORAL at 08:40

## 2024-02-12 RX ADMIN — ACETAMINOPHEN 650 MG: 325 TABLET ORAL at 14:12

## 2024-02-12 RX ADMIN — ASPIRIN 81 MG 81 MG: 81 TABLET ORAL at 18:28

## 2024-02-12 RX ADMIN — PHENOL 1 SPRAY: 1.5 LIQUID ORAL at 13:42

## 2024-02-12 RX ADMIN — CLONAZEPAM 1 MG: 1 TABLET ORAL at 13:40

## 2024-02-12 RX ADMIN — BUPROPION HYDROCHLORIDE 300 MG: 150 TABLET, EXTENDED RELEASE ORAL at 08:40

## 2024-02-12 RX ADMIN — ATORVASTATIN CALCIUM 80 MG: 80 TABLET, FILM COATED ORAL at 20:11

## 2024-02-12 RX ADMIN — GUAIFENESIN 600 MG: 600 TABLET ORAL at 14:13

## 2024-02-12 RX ADMIN — PHENOL 1 SPRAY: 1.5 LIQUID ORAL at 12:41

## 2024-02-12 RX ADMIN — Medication 1 LOZENGE: at 12:39

## 2024-02-12 RX ADMIN — PREGABALIN 200 MG: 150 CAPSULE ORAL at 21:00

## 2024-02-12 RX ADMIN — SODIUM CHLORIDE, PRESERVATIVE FREE 10 ML: 5 INJECTION INTRAVENOUS at 10:07

## 2024-02-12 RX ADMIN — MONTELUKAST 10 MG: 10 TABLET, FILM COATED ORAL at 20:11

## 2024-02-12 RX ADMIN — Medication 1 LOZENGE: at 10:07

## 2024-02-12 RX ADMIN — PANTOPRAZOLE SODIUM 40 MG: 40 TABLET, DELAYED RELEASE ORAL at 06:13

## 2024-02-12 RX ADMIN — LABETALOL HYDROCHLORIDE 200 MG: 200 TABLET, FILM COATED ORAL at 08:40

## 2024-02-12 RX ADMIN — LOSARTAN POTASSIUM 50 MG: 50 TABLET, FILM COATED ORAL at 14:13

## 2024-02-12 RX ADMIN — Medication 1 LOZENGE: at 20:12

## 2024-02-12 RX ADMIN — LABETALOL HYDROCHLORIDE 200 MG: 200 TABLET, FILM COATED ORAL at 20:11

## 2024-02-12 RX ADMIN — PREGABALIN 200 MG: 150 CAPSULE ORAL at 13:40

## 2024-02-12 RX ADMIN — PHENOL 1 SPRAY: 1.5 LIQUID ORAL at 11:12

## 2024-02-12 NOTE — CARE COORDINATION
Case Management Assessment  Initial Evaluation    Date/Time of Evaluation: 2/12/2024 1:40 PM  Assessment Completed by: SCOTT GONZALEZ    If patient is discharged prior to next notation, then this note serves as note for discharge by case management.    Patient Name: Berta Oscar                   YOB: 1975  Diagnosis: Diplopia [H53.2]  Dizziness [R42]  Unsteady gait [R26.81]  Acute CVA (cerebrovascular accident) (HCC) [I63.9]  Stroke with cerebral ischemia (HCC) [I63.9]                   Date / Time: 2/11/2024 11:03 AM    Patient Admission Status: Inpatient   Readmission Risk (Low < 19, Mod (19-27), High > 27): No data recorded  Current PCP: None, None  PCP verified by CM? Yes    Chart Reviewed: Yes      History Provided by: Patient  Patient Orientation: Alert and Oriented    Patient Cognition: Alert    Hospitalization in the last 30 days (Readmission):  No    If yes, Readmission Assessment in CM Navigator will be completed.    Advance Directives:      Code Status: Full Code   Patient's Primary Decision Maker is: Legal Next of Kin      Discharge Planning:    Patient lives with: Children Type of Home: Apartment  Primary Care Giver: Self  Patient Support Systems include: Family Members, Children   Current Financial resources: None  Current community resources: None  Current services prior to admission: Durable Medical Equipment            Current DME: Walker (has one available)            Type of Home Care services:  None    ADLS  Prior functional level: Independent in ADLs/IADLs  Current functional level: Assistance with the following: (PT/OT pending)    PT AM-PAC:   /24  OT AM-PAC:   /24    Family can provide assistance at DC: Yes  Would you like Case Management to discuss the discharge plan with any other family members/significant others, and if so, who? No  Plans to Return to Present Housing: Yes  Other Identified Issues/Barriers to RETURNING to current housing: na  Potential Assistance needed

## 2024-02-12 NOTE — NURSE NAVIGATOR
Verified educational Stroke booklet in room for patient and/or family to review. Patients personal risk factors specific to stroke/TIA include: hypertension, hyperlipidemia, overweight, diabetes.     Patient's chart reviewed for Stroke Core Measures and additional needs:    [x]   VTE prophylaxis - SCDs on   [x]   Antithrombotic (if applicable) - okay for ASA per NCC team, ASA ordered for tonight   [x]   Swallow screen prior to PO intake   [x]   Lipids / A1C ordered or resulted: A1C 6.5, LDL 97   [x]   Therapy ordered - orders for PT and OT evaluations placed   [x]   Care plan and Education template - added     - PT and OT evaluations ordered  - MRI to be completed      Navigator to continue to follow patient while admitted, to assist with follow up and discharge planning as needed.     Nurse eSignature: Electronically signed by Noa Hanley RN on 2/12/24 at 6:13 PM EST - Neuroscience Navigator

## 2024-02-13 ENCOUNTER — APPOINTMENT (OUTPATIENT)
Dept: GENERAL RADIOLOGY | Age: 49
DRG: 062 | End: 2024-02-13
Payer: COMMERCIAL

## 2024-02-13 LAB
ALBUMIN SERPL-MCNC: 3.7 G/DL (ref 3.4–5)
ALP SERPL-CCNC: 118 U/L (ref 40–129)
ALT SERPL-CCNC: 26 U/L (ref 10–40)
ANION GAP SERPL CALCULATED.3IONS-SCNC: 9 MMOL/L (ref 3–16)
AST SERPL-CCNC: 16 U/L (ref 15–37)
BASOPHILS # BLD: 0.1 K/UL (ref 0–0.2)
BASOPHILS NFR BLD: 1.5 %
BILIRUB DIRECT SERPL-MCNC: <0.2 MG/DL (ref 0–0.3)
BILIRUB INDIRECT SERPL-MCNC: ABNORMAL MG/DL (ref 0–1)
BILIRUB SERPL-MCNC: 0.7 MG/DL (ref 0–1)
BUN SERPL-MCNC: 13 MG/DL (ref 7–20)
CALCIUM SERPL-MCNC: 8 MG/DL (ref 8.3–10.6)
CHLORIDE SERPL-SCNC: 105 MMOL/L (ref 99–110)
CO2 SERPL-SCNC: 24 MMOL/L (ref 21–32)
CREAT SERPL-MCNC: 0.7 MG/DL (ref 0.6–1.1)
DEPRECATED RDW RBC AUTO: 13.8 % (ref 12.4–15.4)
EOSINOPHIL # BLD: 0.4 K/UL (ref 0–0.6)
EOSINOPHIL NFR BLD: 5.4 %
FLUAV RNA RESP QL NAA+PROBE: NOT DETECTED
FLUBV RNA RESP QL NAA+PROBE: NOT DETECTED
GFR SERPLBLD CREATININE-BSD FMLA CKD-EPI: >60 ML/MIN/{1.73_M2}
GLUCOSE BLD-MCNC: 138 MG/DL (ref 70–99)
GLUCOSE BLD-MCNC: 169 MG/DL (ref 70–99)
GLUCOSE BLD-MCNC: 181 MG/DL (ref 70–99)
GLUCOSE BLD-MCNC: 204 MG/DL (ref 70–99)
GLUCOSE SERPL-MCNC: 209 MG/DL (ref 70–99)
HCT VFR BLD AUTO: 36.7 % (ref 36–48)
HGB BLD-MCNC: 12.2 G/DL (ref 12–16)
LYMPHOCYTES # BLD: 1.4 K/UL (ref 1–5.1)
LYMPHOCYTES NFR BLD: 20.8 %
MAGNESIUM SERPL-MCNC: 2.3 MG/DL (ref 1.8–2.4)
MCH RBC QN AUTO: 29.1 PG (ref 26–34)
MCHC RBC AUTO-ENTMCNC: 33.3 G/DL (ref 31–36)
MCV RBC AUTO: 87.4 FL (ref 80–100)
MONOCYTES # BLD: 0.7 K/UL (ref 0–1.3)
MONOCYTES NFR BLD: 9.4 %
NEUTROPHILS # BLD: 4.3 K/UL (ref 1.7–7.7)
NEUTROPHILS NFR BLD: 62.9 %
PERFORMED ON: ABNORMAL
PHOSPHATE SERPL-MCNC: 2.9 MG/DL (ref 2.5–4.9)
PLATELET # BLD AUTO: 150 K/UL (ref 135–450)
PMV BLD AUTO: 8.4 FL (ref 5–10.5)
POTASSIUM SERPL-SCNC: 3.6 MMOL/L (ref 3.5–5.1)
PROT SERPL-MCNC: 6 G/DL (ref 6.4–8.2)
RBC # BLD AUTO: 4.2 M/UL (ref 4–5.2)
SARS-COV-2 RNA RESP QL NAA+PROBE: NOT DETECTED
SODIUM SERPL-SCNC: 138 MMOL/L (ref 136–145)
WBC # BLD AUTO: 6.9 K/UL (ref 4–11)

## 2024-02-13 PROCEDURE — 6370000000 HC RX 637 (ALT 250 FOR IP)

## 2024-02-13 PROCEDURE — 36415 COLL VENOUS BLD VENIPUNCTURE: CPT

## 2024-02-13 PROCEDURE — 2580000003 HC RX 258

## 2024-02-13 PROCEDURE — 71045 X-RAY EXAM CHEST 1 VIEW: CPT

## 2024-02-13 PROCEDURE — 97165 OT EVAL LOW COMPLEX 30 MIN: CPT

## 2024-02-13 PROCEDURE — 83735 ASSAY OF MAGNESIUM: CPT

## 2024-02-13 PROCEDURE — 97162 PT EVAL MOD COMPLEX 30 MIN: CPT

## 2024-02-13 PROCEDURE — 97535 SELF CARE MNGMENT TRAINING: CPT

## 2024-02-13 PROCEDURE — 85025 COMPLETE CBC W/AUTO DIFF WBC: CPT

## 2024-02-13 PROCEDURE — 97530 THERAPEUTIC ACTIVITIES: CPT

## 2024-02-13 PROCEDURE — 97116 GAIT TRAINING THERAPY: CPT

## 2024-02-13 PROCEDURE — 1200000000 HC SEMI PRIVATE

## 2024-02-13 PROCEDURE — 87636 SARSCOV2 & INF A&B AMP PRB: CPT

## 2024-02-13 PROCEDURE — 6370000000 HC RX 637 (ALT 250 FOR IP): Performed by: NURSE PRACTITIONER

## 2024-02-13 PROCEDURE — 80069 RENAL FUNCTION PANEL: CPT

## 2024-02-13 PROCEDURE — 80076 HEPATIC FUNCTION PANEL: CPT

## 2024-02-13 PROCEDURE — 6370000000 HC RX 637 (ALT 250 FOR IP): Performed by: STUDENT IN AN ORGANIZED HEALTH CARE EDUCATION/TRAINING PROGRAM

## 2024-02-13 RX ORDER — ATORVASTATIN CALCIUM 80 MG/1
80 TABLET, FILM COATED ORAL NIGHTLY
Qty: 30 TABLET | Refills: 3 | Status: CANCELLED | OUTPATIENT
Start: 2024-02-13

## 2024-02-13 RX ORDER — ASPIRIN 81 MG/1
81 TABLET, CHEWABLE ORAL DAILY
Qty: 30 TABLET | Refills: 3
Start: 2024-02-14

## 2024-02-13 RX ORDER — AMOXICILLIN AND CLAVULANATE POTASSIUM 875; 125 MG/1; MG/1
1 TABLET, FILM COATED ORAL EVERY 12 HOURS SCHEDULED
Status: DISCONTINUED | OUTPATIENT
Start: 2024-02-13 | End: 2024-02-15 | Stop reason: HOSPADM

## 2024-02-13 RX ORDER — GUAIFENESIN/DEXTROMETHORPHAN 100-10MG/5
5 SYRUP ORAL EVERY 4 HOURS PRN
Status: DISCONTINUED | OUTPATIENT
Start: 2024-02-13 | End: 2024-02-15 | Stop reason: HOSPADM

## 2024-02-13 RX ADMIN — ACETAMINOPHEN 650 MG: 325 TABLET ORAL at 07:55

## 2024-02-13 RX ADMIN — PREGABALIN 200 MG: 150 CAPSULE ORAL at 12:47

## 2024-02-13 RX ADMIN — PANTOPRAZOLE SODIUM 40 MG: 40 TABLET, DELAYED RELEASE ORAL at 07:43

## 2024-02-13 RX ADMIN — POLYVINYL ALCOHOL 2 DROP: 14 SOLUTION/ DROPS OPHTHALMIC at 07:50

## 2024-02-13 RX ADMIN — INSULIN LISPRO 1 UNITS: 100 INJECTION, SOLUTION INTRAVENOUS; SUBCUTANEOUS at 11:18

## 2024-02-13 RX ADMIN — Medication 6 MG: at 21:34

## 2024-02-13 RX ADMIN — MONTELUKAST 10 MG: 10 TABLET, FILM COATED ORAL at 21:34

## 2024-02-13 RX ADMIN — DULOXETINE HYDROCHLORIDE 60 MG: 60 CAPSULE, DELAYED RELEASE ORAL at 07:42

## 2024-02-13 RX ADMIN — CLONAZEPAM 1 MG: 1 TABLET ORAL at 12:47

## 2024-02-13 RX ADMIN — LOSARTAN POTASSIUM 50 MG: 50 TABLET, FILM COATED ORAL at 07:43

## 2024-02-13 RX ADMIN — ACETAMINOPHEN 650 MG: 325 TABLET ORAL at 13:17

## 2024-02-13 RX ADMIN — GUAIFENESIN SYRUP AND DEXTROMETHORPHAN 5 ML: 100; 10 SYRUP ORAL at 18:49

## 2024-02-13 RX ADMIN — PREGABALIN 200 MG: 150 CAPSULE ORAL at 21:34

## 2024-02-13 RX ADMIN — CLONAZEPAM 1 MG: 1 TABLET ORAL at 01:11

## 2024-02-13 RX ADMIN — PREGABALIN 200 MG: 150 CAPSULE ORAL at 07:42

## 2024-02-13 RX ADMIN — ASPIRIN 81 MG 81 MG: 81 TABLET ORAL at 07:43

## 2024-02-13 RX ADMIN — SODIUM CHLORIDE, PRESERVATIVE FREE 10 ML: 5 INJECTION INTRAVENOUS at 07:44

## 2024-02-13 RX ADMIN — Medication 1 LOZENGE: at 14:54

## 2024-02-13 RX ADMIN — DULOXETINE HYDROCHLORIDE 60 MG: 60 CAPSULE, DELAYED RELEASE ORAL at 21:34

## 2024-02-13 RX ADMIN — Medication 1 LOZENGE: at 04:15

## 2024-02-13 RX ADMIN — BUPROPION HYDROCHLORIDE 300 MG: 150 TABLET, EXTENDED RELEASE ORAL at 07:43

## 2024-02-13 RX ADMIN — AMOXICILLIN AND CLAVULANATE POTASSIUM 1 TABLET: 875; 125 TABLET, FILM COATED ORAL at 21:34

## 2024-02-13 RX ADMIN — Medication 1 LOZENGE: at 21:35

## 2024-02-13 RX ADMIN — LABETALOL HYDROCHLORIDE 200 MG: 200 TABLET, FILM COATED ORAL at 07:43

## 2024-02-13 RX ADMIN — GUAIFENESIN 600 MG: 600 TABLET ORAL at 21:34

## 2024-02-13 RX ADMIN — PHENOL 1 SPRAY: 1.5 LIQUID ORAL at 07:50

## 2024-02-13 RX ADMIN — SODIUM CHLORIDE, PRESERVATIVE FREE 10 ML: 5 INJECTION INTRAVENOUS at 21:36

## 2024-02-13 RX ADMIN — LABETALOL HYDROCHLORIDE 200 MG: 200 TABLET, FILM COATED ORAL at 21:35

## 2024-02-13 RX ADMIN — ATORVASTATIN CALCIUM 80 MG: 80 TABLET, FILM COATED ORAL at 21:34

## 2024-02-13 RX ADMIN — Medication 1 LOZENGE: at 07:43

## 2024-02-13 RX ADMIN — GUAIFENESIN 600 MG: 600 TABLET ORAL at 07:42

## 2024-02-13 RX ADMIN — Medication 1 LOZENGE: at 01:11

## 2024-02-13 RX ADMIN — PHENOL 1 SPRAY: 1.5 LIQUID ORAL at 17:06

## 2024-02-13 NOTE — CARE COORDINATION
DISCHARGE PLANNIN  Discharge order noted.  Patient to discharge home with  no CM needs. I met with patient yesterday who stated her mother can provide transportation home.    UPDATE: 9720  This CM received a call from bedside RN that patient was requesting information on furniture assistance.  I met with patient and mother at bedside who stated they do not need help getting furniture; they wanted information on helping patient at home.   I educated them that PT/OT evaluation patient and stated she was safe to be home alone. We could set up HHC but they will only come a few times a week and patient is to [place] for COA.  Patient stated she doesn't really need help at home, she's just afraid of dying in her sleep.  We discussed options (private pay, reaching out to insurance, asking local friends, etc).    UPDATE: 7599  Chart reviewed due to discharge order being removed.  Order placed for chest xray due to productive couch.  Referral placed for psych due to SI.      Current discharge plan is home with no needs.   CM team will continue to follow.  Lucretia Graham RN Case Manager  [phone]

## 2024-02-13 NOTE — DISCHARGE INSTRUCTIONS
[facility] Stroke Program Survey  The [facility] [place] values your feedback related to your recent hospital visit and admission. We strive to improve our Neuroscience program to promote better outcomes and recoveries for all our patients.  The anonymous survey below consists of a few questions that are related to your stay and around your Stroke diagnosis, treatment, and recovery. It is anonymous and has only a few questions.  The estimated length of time needed to complete this survey is 3 minutes or less. Thank you for completing this survey!        FURNITURE RESOURCES:  [employer]  [address], [place] [address]  [phone]  [email or web address]    [employer]  [address], [place] [address]  [phone]    Berta Oscar ([birth year])    Patient instructions for CAM monitor:  You will need to wear 1 monitor for 2 weeks, for a total of 14 days.    We will place your 14 day/2 week monitor today, Wednesday, February 14th.    On Wednesday, February 28th, remove your monitor, place it in the box, and return it to the  [facility], (see address below), anytime between 8:30 AM and 4:30 PM. Leave the box with the  at the .       IF YOU CANNOT MAKE THIS APPOINTMENT, CALL THE [facility] AT  [phone] AND THEY WILL ASSIST YOU IN MAKING OTHER ARRANGEMENTS.    [address]  [address]  [place], [place] [address]  PHONE: [phone]         If the monitor comes off but has been in place at least 7 days place in box & return it to the [facility] [place].  If it comes off sooner than 7 days please call the office and we will help you make arrangements to have it replaced.        Avoid excess sweating to maximize wear time.         You are able to shower after 24 hours, however have majority of water hitting back and not directly on monitor. Do not submerge in bath.  No Swimming

## 2024-02-13 NOTE — FLOWSHEET NOTE
02/13/24 1549   Vitals   Temp 99.2 °F (37.3 °C)   Temp Source Oral   Pulse 86   Heart Rate Source Monitor   Respirations 18   BP (!) 147/95   MAP (Calculated) 112   BP Location Left upper arm   BP Method Automatic   Patient Position Semi fowlers   Oxygen Therapy   SpO2 95 %   Pulse Oximetry Type Intermittent   Pulse Oximeter Device Location Right;Finger   O2 Device None (Room air)       Patient arrived to unit.  Patient placed in suicide precautions.  Sitter at bedside.  Belongings packed up and taken to the nurses station.  Ordered a safety tray.  Patient states \" the only way she would harm  herself is by taking pills'.  She states \"[facility]en[facility].  \"  She states the sitter has been great distracting her with talking about other things.  She is under a lot of stress at home because she and her daughter about to be kicked out of her apartment because of the smell.  Patient states she has a daughter that struggles with depression and suffers from incontinence and leaves the soiled pads in her room and all she can do is spray Febreeze. \"

## 2024-02-13 NOTE — CONSULTS
Psychiatry Initial Consultation    Patient Name: Berta Oscar  MRN: [id number]  Admission Date: 2/11/2024    Reason for Consult: Suicidal ideation    HPI:   Berta Oscar is a 48 y.o. female who presented to the hospital on 02/11/2024 with a chief complaint of dizziness. Per ED documentation, Patient states that at approximately 8 AM this morning, 15 minutes after she woke up, she developed new balance issues where she felt unsteady on her feet. She then developed gradually progressive dizziness that has been constant and worsening. 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 She then had worsening of her dizziness, asked nurse to check her blood pressure and apparently her systolics were in the 80s, so patient was brought to the emergency room. Patient continues to have dizziness, blurred vision and also has notable slurred speech. She does not know when the slurred speech started. She is never had a stroke before. Does not take anticoagulation. She was seen by neurology while admitted. On 02/13, she verbalized suicidal ideation and noted that When asked if she has a plan, she states that she would \"do it the same way she tried before - overdose on medications.\"      Met with Berta, who endorses depression and anxiety. Patient identified symptoms of depression are constant and include low energy and motivation, increased isolation, states \"I feel like I'm wearing a mask around others\". She also notes that as her depression worsens she will have issues completing her ADLs; however, she notes she has been able to maintain these recently because she has been working. She reports suicidal ideation with plan to overdose on her medications, noting that these thoughts have become more frequent and intense recently. The only thing that has prevented her from acting on these thoughts are her children. She also endorses

## 2024-02-14 ENCOUNTER — TELEPHONE (OUTPATIENT)
Dept: CARDIOLOGY | Age: 49
End: 2024-02-14

## 2024-02-14 LAB
AMPHETAMINES UR QL SCN>1000 NG/ML: NORMAL
BARBITURATES UR QL SCN>200 NG/ML: NORMAL
BENZODIAZ UR QL SCN>200 NG/ML: NORMAL
CANNABINOIDS UR QL SCN>50 NG/ML: NORMAL
COCAINE UR QL SCN: NORMAL
DRUG SCREEN COMMENT UR-IMP: NORMAL
FENTANYL SCREEN, URINE: NORMAL
GLUCOSE BLD-MCNC: 157 MG/DL (ref 70–99)
GLUCOSE BLD-MCNC: 160 MG/DL (ref 70–99)
GLUCOSE BLD-MCNC: 232 MG/DL (ref 70–99)
HCG UR QL: NEGATIVE
METHADONE UR QL SCN>300 NG/ML: NORMAL
OPIATES UR QL SCN>300 NG/ML: NORMAL
PCP UR QL SCN>25 NG/ML: NORMAL
PERFORMED ON: ABNORMAL
PH UR STRIP: 6 [PH]

## 2024-02-14 PROCEDURE — 87807 RSV ASSAY W/OPTIC: CPT

## 2024-02-14 PROCEDURE — 84703 CHORIONIC GONADOTROPIN ASSAY: CPT

## 2024-02-14 PROCEDURE — 6370000000 HC RX 637 (ALT 250 FOR IP): Performed by: STUDENT IN AN ORGANIZED HEALTH CARE EDUCATION/TRAINING PROGRAM

## 2024-02-14 PROCEDURE — 2580000003 HC RX 258

## 2024-02-14 PROCEDURE — 6370000000 HC RX 637 (ALT 250 FOR IP)

## 2024-02-14 PROCEDURE — 1200000000 HC SEMI PRIVATE

## 2024-02-14 PROCEDURE — 6370000000 HC RX 637 (ALT 250 FOR IP): Performed by: NURSE PRACTITIONER

## 2024-02-14 PROCEDURE — 80307 DRUG TEST PRSMV CHEM ANLYZR: CPT

## 2024-02-14 RX ORDER — AMOXICILLIN AND CLAVULANATE POTASSIUM 875; 125 MG/1; MG/1
1 TABLET, FILM COATED ORAL EVERY 12 HOURS SCHEDULED
Qty: 8 TABLET | Refills: 0 | Status: SHIPPED | OUTPATIENT
Start: 2024-02-14 | End: 2024-02-18

## 2024-02-14 RX ADMIN — LOSARTAN POTASSIUM 50 MG: 50 TABLET, FILM COATED ORAL at 10:02

## 2024-02-14 RX ADMIN — ATORVASTATIN CALCIUM 80 MG: 80 TABLET, FILM COATED ORAL at 19:29

## 2024-02-14 RX ADMIN — PREGABALIN 200 MG: 150 CAPSULE ORAL at 15:06

## 2024-02-14 RX ADMIN — PREGABALIN 200 MG: 150 CAPSULE ORAL at 19:29

## 2024-02-14 RX ADMIN — PREGABALIN 200 MG: 150 CAPSULE ORAL at 10:02

## 2024-02-14 RX ADMIN — BUPROPION HYDROCHLORIDE 300 MG: 150 TABLET, EXTENDED RELEASE ORAL at 10:01

## 2024-02-14 RX ADMIN — GUAIFENESIN 600 MG: 600 TABLET ORAL at 10:01

## 2024-02-14 RX ADMIN — MONTELUKAST 10 MG: 10 TABLET, FILM COATED ORAL at 19:29

## 2024-02-14 RX ADMIN — AMOXICILLIN AND CLAVULANATE POTASSIUM 1 TABLET: 875; 125 TABLET, FILM COATED ORAL at 10:01

## 2024-02-14 RX ADMIN — Medication 1 LOZENGE: at 13:09

## 2024-02-14 RX ADMIN — ACETAMINOPHEN 650 MG: 325 TABLET ORAL at 10:01

## 2024-02-14 RX ADMIN — DULOXETINE HYDROCHLORIDE 60 MG: 60 CAPSULE, DELAYED RELEASE ORAL at 10:01

## 2024-02-14 RX ADMIN — SODIUM CHLORIDE, PRESERVATIVE FREE 10 ML: 5 INJECTION INTRAVENOUS at 10:03

## 2024-02-14 RX ADMIN — ASPIRIN 81 MG 81 MG: 81 TABLET ORAL at 10:01

## 2024-02-14 RX ADMIN — LABETALOL HYDROCHLORIDE 200 MG: 200 TABLET, FILM COATED ORAL at 10:02

## 2024-02-14 RX ADMIN — AMOXICILLIN AND CLAVULANATE POTASSIUM 1 TABLET: 875; 125 TABLET, FILM COATED ORAL at 19:28

## 2024-02-14 RX ADMIN — INSULIN LISPRO 1 UNITS: 100 INJECTION, SOLUTION INTRAVENOUS; SUBCUTANEOUS at 18:27

## 2024-02-14 RX ADMIN — DULOXETINE HYDROCHLORIDE 60 MG: 60 CAPSULE, DELAYED RELEASE ORAL at 19:28

## 2024-02-14 RX ADMIN — GUAIFENESIN 600 MG: 600 TABLET ORAL at 19:29

## 2024-02-14 RX ADMIN — CLONAZEPAM 1 MG: 1 TABLET ORAL at 19:28

## 2024-02-14 RX ADMIN — Medication 1 LOZENGE: at 01:15

## 2024-02-14 RX ADMIN — LABETALOL HYDROCHLORIDE 200 MG: 200 TABLET, FILM COATED ORAL at 19:29

## 2024-02-14 RX ADMIN — Medication 1 LOZENGE: at 10:01

## 2024-02-14 RX ADMIN — CLONAZEPAM 1 MG: 1 TABLET ORAL at 10:02

## 2024-02-14 RX ADMIN — PANTOPRAZOLE SODIUM 40 MG: 40 TABLET, DELAYED RELEASE ORAL at 05:26

## 2024-02-14 RX ADMIN — Medication 1 LOZENGE: at 21:11

## 2024-02-14 NOTE — DISCHARGE SUMMARY
Discharge Summary    Hospital Course:     Berta Oscar is a 48 y.o. female with history of T2DM, HTN, HLD, and depression who presented on 2/11 with vertigo, slurred speech, and diplopia. 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 She was evaluated in the ED; CT head was unremarkable. She was given TNK. CTA with no LVO. MRI brain unremarkable. She was seen by neurology and given her multiple stroke risk factors, she was continued on aspirin. Stressed importance of controlling other risk factors (DM, HTN). Pt endorsed suicidal ideation with plan to overdose on her medications and was therefore evaluated by psychiatry with plan to discharge to inpatient psych. Patient is medically cleared for discharge. See below for further details.      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 She was evaluated in the ED; CT head was unremarkable. She was given TNK. CTA with no LVO. MRI brain unremarkable.   - PT/OT: home with assist PRN   - Discussed with neurology ANAHI. Plan to continue on ASA due to multiple stroke risk factors   - LDL 97, continue atorvastatin   - A1C 6.5; holding home orals while inpatient, on SSI   - 2 week cardiac event monitor on discharge     Depression/anxiety  Suicidal ideation  Pt has longstanding history of depression with suicide attempt in her teens (intentional medication overdose, was hospitalized). Reports that she has since been hospitalized in psychiatric facilities ~5 times since then.  She used to follow with a therapist and her psychiatrist but has not been seeing anyone since her insurance has changed with her jobs which to [employer] (started in 12/2023).  Patient states that she has been under significant amount of stress - at risk of losing her job, behind on her bills, does not have a good relationship with her daughter who she lives with.  Protective factors

## 2024-02-14 NOTE — CARE COORDINATION
Pink slip on chart-signed by psych. Awaiting psych note for referral to inpatient psych (must by on chart to continue processing. Perfectserved Dr. Brar for all needed tests and notes to be able to call transfer center.  CM will continue to follow patient until discharge.  Electronically signed by Bernarda Saldaña RN on 2/14/2024 at 11:11 AM      Addendum:  Still awaiting RSV results and preg test negative. Then will call Transfer Center for placement in inpatient psych. Mother called about info to let [facility] [employer] know patient is on leave. Mother stated that patient has no phone at this time. Electronically signed by Bernarda Saldaña RN on 2/14/2024 at 5:28 PM

## 2024-02-14 NOTE — TELEPHONE ENCOUNTER
Per Neuro, [facility], 2 week CAM for stroke.  2 Week CAM ID #: [id number] placed.  Written and verbal instructions given to patient and mother who was present in room at time of placement. Verb understanding.

## 2024-02-15 ENCOUNTER — NURSE ONLY (OUTPATIENT)
Dept: CARDIOLOGY | Age: 49
End: 2024-02-15

## 2024-02-15 VITALS
OXYGEN SATURATION: 92 % | DIASTOLIC BLOOD PRESSURE: 82 MMHG | HEIGHT: 68 IN | HEART RATE: 79 BPM | SYSTOLIC BLOOD PRESSURE: 138 MMHG | TEMPERATURE: 97.8 F | RESPIRATION RATE: 16 BRPM | BODY MASS INDEX: 25.43 KG/M2 | WEIGHT: 167.77 LBS

## 2024-02-15 LAB
GLUCOSE BLD-MCNC: 195 MG/DL (ref 70–99)
GLUCOSE BLD-MCNC: 200 MG/DL (ref 70–99)
GLUCOSE BLD-MCNC: 216 MG/DL (ref 70–99)
PERFORMED ON: ABNORMAL
RSV AG NOSE QL: NEGATIVE

## 2024-02-15 PROCEDURE — 6370000000 HC RX 637 (ALT 250 FOR IP)

## 2024-02-15 PROCEDURE — 6370000000 HC RX 637 (ALT 250 FOR IP): Performed by: NURSE PRACTITIONER

## 2024-02-15 PROCEDURE — 6370000000 HC RX 637 (ALT 250 FOR IP): Performed by: STUDENT IN AN ORGANIZED HEALTH CARE EDUCATION/TRAINING PROGRAM

## 2024-02-15 PROCEDURE — 2580000003 HC RX 258

## 2024-02-15 RX ORDER — TRAZODONE HYDROCHLORIDE 50 MG/1
50 TABLET ORAL NIGHTLY
Status: DISCONTINUED | OUTPATIENT
Start: 2024-02-15 | End: 2024-02-15 | Stop reason: HOSPADM

## 2024-02-15 RX ORDER — METHOCARBAMOL 500 MG/1
500 TABLET, FILM COATED ORAL 3 TIMES DAILY
Status: DISCONTINUED | OUTPATIENT
Start: 2024-02-15 | End: 2024-02-15 | Stop reason: HOSPADM

## 2024-02-15 RX ORDER — GUANFACINE 3 MG/1
3 TABLET, EXTENDED RELEASE ORAL NIGHTLY
Status: DISCONTINUED | OUTPATIENT
Start: 2024-02-15 | End: 2024-02-15 | Stop reason: HOSPADM

## 2024-02-15 RX ORDER — METFORMIN HYDROCHLORIDE 500 MG/1
500 TABLET, EXTENDED RELEASE ORAL
Status: DISCONTINUED | OUTPATIENT
Start: 2024-02-16 | End: 2024-02-15 | Stop reason: HOSPADM

## 2024-02-15 RX ADMIN — PANTOPRAZOLE SODIUM 40 MG: 40 TABLET, DELAYED RELEASE ORAL at 05:34

## 2024-02-15 RX ADMIN — Medication 1 LOZENGE: at 14:55

## 2024-02-15 RX ADMIN — BUPROPION HYDROCHLORIDE 300 MG: 150 TABLET, EXTENDED RELEASE ORAL at 08:53

## 2024-02-15 RX ADMIN — CLONAZEPAM 1 MG: 1 TABLET ORAL at 08:53

## 2024-02-15 RX ADMIN — ASPIRIN 81 MG 81 MG: 81 TABLET ORAL at 08:53

## 2024-02-15 RX ADMIN — Medication 1 LOZENGE: at 05:34

## 2024-02-15 RX ADMIN — INSULIN LISPRO 1 UNITS: 100 INJECTION, SOLUTION INTRAVENOUS; SUBCUTANEOUS at 14:00

## 2024-02-15 RX ADMIN — LABETALOL HYDROCHLORIDE 200 MG: 200 TABLET, FILM COATED ORAL at 08:53

## 2024-02-15 RX ADMIN — GUAIFENESIN 600 MG: 600 TABLET ORAL at 08:53

## 2024-02-15 RX ADMIN — ACETAMINOPHEN 650 MG: 325 TABLET ORAL at 18:35

## 2024-02-15 RX ADMIN — AMOXICILLIN AND CLAVULANATE POTASSIUM 1 TABLET: 875; 125 TABLET, FILM COATED ORAL at 08:53

## 2024-02-15 RX ADMIN — DULOXETINE HYDROCHLORIDE 60 MG: 60 CAPSULE, DELAYED RELEASE ORAL at 08:53

## 2024-02-15 RX ADMIN — PREGABALIN 200 MG: 150 CAPSULE ORAL at 14:00

## 2024-02-15 RX ADMIN — Medication 1 LOZENGE: at 18:35

## 2024-02-15 RX ADMIN — PREGABALIN 200 MG: 150 CAPSULE ORAL at 08:53

## 2024-02-15 RX ADMIN — LOSARTAN POTASSIUM 50 MG: 50 TABLET, FILM COATED ORAL at 08:53

## 2024-02-15 RX ADMIN — SODIUM CHLORIDE, PRESERVATIVE FREE 5 ML: 5 INJECTION INTRAVENOUS at 08:57

## 2024-02-15 RX ADMIN — GUAIFENESIN SYRUP AND DEXTROMETHORPHAN 5 ML: 100; 10 SYRUP ORAL at 14:55

## 2024-02-15 RX ADMIN — METHOCARBAMOL 500 MG: 500 TABLET ORAL at 14:55

## 2024-02-15 NOTE — PLAN OF CARE
Problem: Discharge Planning  Goal: Discharge to home or other facility with appropriate resources  2/12/2024 0219 by Selina Abbott, RN  Outcome: Progressing  Flowsheets (Taken 2/11/2024 2000)  Discharge to home or other facility with appropriate resources:   Identify barriers to discharge with patient and caregiver   Arrange for needed discharge resources and transportation as appropriate     Problem: Safety - Adult  Goal: Free from fall injury  2/12/2024 0219 by Selina Abbott, RN  Outcome: Progressing     
  Problem: Discharge Planning  Goal: Discharge to home or other facility with appropriate resources  2/13/2024 0226 by Selina Abbott, RN  Outcome: Progressing  Flowsheets (Taken 2/12/2024 2000)  Discharge to home or other facility with appropriate resources: Identify barriers to discharge with patient and caregiver     Problem: Safety - Adult  Goal: Free from fall injury  2/13/2024 0226 by Selina Abbott, RN  Outcome: Progressing     
  Problem: Discharge Planning  Goal: Discharge to home or other facility with appropriate resources  2/13/2024 1251 by Ivonne Weiss RN  Outcome: Completed  2/13/2024 0950 by Ivonne Weiss RN  Outcome: Progressing  Flowsheets (Taken 2/13/2024 0800)  Discharge to home or other facility with appropriate resources:   Identify barriers to discharge with patient and caregiver   Arrange for needed discharge resources and transportation as appropriate   Identify discharge learning needs (meds, wound care, etc)  2/13/2024 0226 by Selina Abbott RN  Outcome: Progressing  Flowsheets (Taken 2/12/2024 2000)  Discharge to home or other facility with appropriate resources: Identify barriers to discharge with patient and caregiver     Problem: Pain  Goal: Verbalizes/displays adequate comfort level or baseline comfort level  2/13/2024 1251 by Ivonne Weiss RN  Outcome: Completed  2/13/2024 0950 by Ivonne Weiss RN  Outcome: Progressing     Problem: Safety - Adult  Goal: Free from fall injury  2/13/2024 1251 by Ivonne Weiss RN  Outcome: Completed  2/13/2024 0950 by Ivonne Weiss RN  Outcome: Progressing  Flowsheets (Taken 2/13/2024 0949)  Free From Fall Injury:   Instruct family/caregiver on patient safety   Based on caregiver fall risk screen, instruct family/caregiver to ask for assistance with transferring infant if caregiver noted to have fall risk factors  2/13/2024 0226 by Selina Abbott RN  Outcome: Progressing     Problem: ABCDS Injury Assessment  Goal: Absence of physical injury  2/13/2024 1251 by Ivonne Weiss RN  Outcome: Completed  2/13/2024 0950 by Ivonne Weiss RN  Outcome: Progressing  Flowsheets (Taken 2/13/2024 0949)  Absence of Physical Injury: Implement safety measures based on patient assessment     Problem: Neurosensory - Adult  Goal: Achieves stable or improved neurological status  2/13/2024 1251 by Ivonne Weiss RN  Outcome: Completed  2/13/2024 0950 by Ivonne Weiss RN  Outcome: 
  Problem: Discharge Planning  Goal: Discharge to home or other facility with appropriate resources  Outcome: Progressing  Flowsheets (Taken 2/11/2024 1245)  Discharge to home or other facility with appropriate resources: Identify barriers to discharge with patient and caregiver     Problem: Pain  Goal: Verbalizes/displays adequate comfort level or baseline comfort level  Outcome: Progressing     Problem: Safety - Adult  Goal: Free from fall injury  Outcome: Progressing     
  Problem: Discharge Planning  Goal: Discharge to home or other facility with appropriate resources  Outcome: Progressing  Flowsheets (Taken 2/12/2024 0800)  Discharge to home or other facility with appropriate resources:   Identify barriers to discharge with patient and caregiver   Arrange for needed discharge resources and transportation as appropriate   Identify discharge learning needs (meds, wound care, etc)     Problem: Pain  Goal: Verbalizes/displays adequate comfort level or baseline comfort level  Outcome: Progressing  Flowsheets (Taken 2/12/2024 0800)  Verbalizes/displays adequate comfort level or baseline comfort level:   Encourage patient to monitor pain and request assistance   Assess pain using appropriate pain scale   Administer analgesics based on type and severity of pain and evaluate response     Problem: Safety - Adult  Goal: Free from fall injury  Outcome: Progressing  Flowsheets (Taken 2/12/2024 1736)  Free From Fall Injury:   Instruct family/caregiver on patient safety   Based on caregiver fall risk screen, instruct family/caregiver to ask for assistance with transferring infant if caregiver noted to have fall risk factors     Problem: ABCDS Injury Assessment  Goal: Absence of physical injury  Outcome: Progressing  Flowsheets (Taken 2/12/2024 1736)  Absence of Physical Injury: Implement safety measures based on patient assessment     Problem: Neurosensory - Adult  Goal: Achieves stable or improved neurological status  Outcome: Progressing  Goal: Achieves maximal functionality and self care  Outcome: Progressing     Problem: Respiratory - Adult  Goal: Achieves optimal ventilation and oxygenation  Outcome: Progressing     Problem: Cardiovascular - Adult  Goal: Maintains optimal cardiac output and hemodynamic stability  Outcome: Progressing  Goal: Absence of cardiac dysrhythmias or at baseline  Outcome: Progressing     Problem: Genitourinary - Adult  Goal: Absence of urinary retention  Outcome: 
  Problem: Self Harm/Suicidality  Goal: Will have no self-injury during hospital stay  Description: INTERVENTIONS:  1.  Ensure constant observer at bedside with Q15M safety checks  2.  Maintain a safe environment  3.  Secure patient belongings  4.  Ensure family/visitors adhere to safety recommendations  5.  Ensure safety tray has been added to patient's diet order  6.  Every shift and PRN: Re-assess suicidal risk via Frequent Screener    Outcome: Progressing   Sitter at bedside q2hr checks  
Brief Stroke Team Plan of Care Note    I discussed the patient with the ED provider. Our video telemedicine system was not working so I was not able to assess the patient by video, however. In brief, Berta is a 47 yo F with PMH HTN and DM who woke in her usual health this AM. At 0800, she suddenly developed dizziness and difficulty walking. In the ED, the provider felt that she had slurred sleepch, mild ataxia with L FTN, and she could not walk. BP 80s/40s. Patient is not on anticoagulation. CT head without hemorrhage, no areas of hypodensity. No LVO on CTA.    Discussed with the ED provider that the patient is a good candidate for TNK, so long as she does not have any clear contraindications (ICH, ischemic stroke in the last 3mo, recent surgeries). They were comfortable with discussing the risks and benefits with the patient directly. Stroke team is available with any questions or concerns.  
Brief note:  47yo woman presented to ER with lightheadedness/vertigo, monocular diplopia, and unusual stuttering speech pattern raising concerns for possible stroke and was administered TNK at 11:55 on   MRI of brain pending  Symptoms have now resolved   A1C - 6.5  LDL 97    MRI reviewed, and without acute stroke.    PHYSICAL EXAM:  Vitals:    24 1700 24 1800 24 1900 24   BP: 133/78 124/78 130/78    Pulse: 85 86 85    Resp: 17 17 20    Temp:       TempSrc:    Axillary   SpO2: 93%      Weight:       Height:             General: Alert, no distress, well-nourished  Neurologic  Mental status:   orientation to person, place, time, situation   Attention intact as able to attend well to the exam     Language fluent in conversation   Comprehension intact; follows simple commands    Cranial nerves:   CN2: Visual fields full w/o extinction on confrontational testing  CN 3,4,6: Pupils equal and reactive to light, extraocular muscles intact  CN5: Facial sensation symmetric   CN7: Face symmetric  CN8: Hearing symmetric to spoken voice  CN9: Palate elevated symmetrically  CN11: Traps full strength on shoulder shrug  CN12: Tongue midline with protrusion    Motor Exam:  5/5 Strength throughout all four extremities.       NIH Stroke Scale    1a  Level of consciousness: 0=alert; keenly responsive   1b. LOC questions:  0=Answers both questions correctly   1c. LOC commands: 0=Performs both tasks correctly   2.  Best Gaze: 0=normal   3.  Visual: 0=No visual loss   4. Facial Palsy: 0=Normal symmetric movement   5a.  Motor left arm: 0=No drift, limb holds 90 (or 45) degrees for full 10 seconds   5b.  Motor right arm: 0=No drift, limb holds 90 (or 45) degrees for full 10 seconds   6a. motor left le=No drift, limb holds 90 (or 45) degrees for full 10 seconds   6b  Motor right le=No drift, limb holds 90 (or 45) degrees for full 10 seconds   7. Limb Ataxia: 0=Absent   8.  Sensory: 0=Normal; no 
care  Outcome: Progressing  Flowsheets (Taken 2/13/2024 0800)  Achieves maximal functionality and self care:   Monitor swallowing and airway patency with patient fatigue and changes in neurological status   Encourage and assist patient to increase activity and self care with guidance from physical therapy/occupational therapy     Problem: Respiratory - Adult  Goal: Achieves optimal ventilation and oxygenation  Outcome: Progressing  Flowsheets (Taken 2/13/2024 0800)  Achieves optimal ventilation and oxygenation:   Assess for changes in respiratory status   Assess for changes in mentation and behavior   Position to facilitate oxygenation and minimize respiratory effort     Problem: Cardiovascular - Adult  Goal: Maintains optimal cardiac output and hemodynamic stability  Outcome: Progressing  Goal: Absence of cardiac dysrhythmias or at baseline  Outcome: Progressing     Problem: Genitourinary - Adult  Goal: Absence of urinary retention  Outcome: Progressing  Flowsheets (Taken 2/13/2024 0800)  Absence of urinary retention:   Assess patient’s ability to void and empty bladder   Monitor intake/output and perform bladder scan as needed  Goal: Urinary catheter remains patent  Outcome: Progressing

## 2024-02-15 NOTE — CARE COORDINATION
ULI  recv'd  Message that  [facility] has  accepted  pt  for  1830 transport via  [facility]:      CM will updated pt and family   Mother , ( Marlen Ga  [phone])    upon confirmation       Electronically signed by Ynes Trinidad RN on 2/15/2024 at 4:51 PM   __________________________________________________________      ULI  recv'd montrell   from Marnie at the [facility]  stating that [facility] can  accept :    Requested CM fax  [place] and  [facility] ;    RN report  #:  [phone]  RN Fax:   [phone]    Once confirmed  they will arrange  Transport  .     RN and  Charge  RN  informed  .      Electronically signed by Ynes Trinidad RN on 2/15/2024 at 4:25 PM     +++++++++++++++++++++++++++++++++++++++++++++++++++++++      CM  followed up on  [facility]  Intake  ;  [facility] is  reviewing  .      Awaiting determination : to accept      Electronically signed by Ynes Trinidad RN on 2/15/2024 at 2:57 PM     ++++++++++++++++++++++++++++++++++++++++++++++++++++++++      CM  following for  d/c planning    Patient plans  to transfer  to Inpatient  [facility] :    CM confirmed the following :    1.) Statement of  Beliefs completed  ( on the chart)   2.)  Medically cleared  by  Attending   3.)  Covid RSV and Flu screening completed / resulted  4.)  D/C order in place .       ULI  called [facility]  [phone]  ans  spoke with Mima  , to  complete  Intake:    She  will call back with any add'l question / needs and  update  CM  with Accepting  facility  once  known then  CM  will update , family and bed side  RN .     Electronically signed by Ynes Trinidad RN on 2/15/2024 at 12:36 PM       Ynes Trinidad RN Case Manager  [facility]  [address]  [place] [id number] [phone]  Fax [phone]

## 2024-02-15 NOTE — CARE COORDINATION
Case Management Assessment            Discharge Note                    Date / Time of Note: 2/15/2024 4:54 PM                  Discharge Note Completed by: Ynes Trniidad RN    Patient Name: Berta Oscar   YOB: 1975  Diagnosis: Diplopia [H53.2]  Dizziness [R42]  Unsteady gait [R26.81]  Acute CVA (cerebrovascular accident) (HCC) [I63.9]  Stroke with cerebral ischemia (HCC) [I63.9]   Date / Time: 2/11/2024 11:03 AM    Current PCP: None, None  Clinic patient: No    Hospitalization in the last 30 days: No       Advance Directives:  Code Status: Full Code  [place] DNR form completed and on chart: No    Financial:  Payor: [facility] / Plan: [employer] EMPLOYEES / Product Type: *No Product type* /      Pharmacy:    [facility] [id number] [address] [facility] -  [phone] -  [phone]  [address] [facility] [address]  Phone: [phone] Fax: [phone]      Assistance purchasing medications?: Potential Assistance Purchasing Medications: No  Assistance provided by Case Management: None at this time    Does patient want to participate in local refill/ meds to beds program?:      Meds To Beds General Rules:  1. Can ONLY be done Monday- Friday between 8:30am-5pm  2. Prescription(s) must be in pharmacy by 3pm to be filled same day  3.Copy of patient's insurance/ prescription drug card and patient face sheet must be sent along with the prescription(s)  4. Cost of Rx cannot be added to hospital bill. If financial assistance is needed, please contact unit  or ;  or  CANNOT provide pharmacy voucher for patients co-pays  5. Patients can then  the prescription on their way out of the hospital at discharge, or pharmacy can deliver to the bedside if staff is available. (payment due at time of pick-up or delivery - cash, check, or card accepted)     Able to afford home medications/ co-pay costs: Yes    ADLS:  Current PT AM-PAC

## 2024-02-15 NOTE — DISCHARGE INSTR - COC
{CHP DME ADLs:523260590}  Feeding  {CHP DME ADLs:970444361}  Med Admin  {P DME ADLs:625320317}  Med Delivery   { LUANA MED Delivery:284525452}    Wound Care Documentation and Therapy:        Elimination:  Continence:   Bowel: {YES / NO:}  Bladder: {YES / NO:}  Urinary Catheter: {Urinary Catheter:314636026}   Colostomy/Ileostomy/Ileal Conduit: {YES / NO:}       Date of Last BM: ***    Intake/Output Summary (Last 24 hours) at 2/15/2024 1612  Last data filed at 2/15/2024 0915  Gross per 24 hour   Intake 660 ml   Output --   Net 660 ml     I/O last 3 completed shifts:  In: 830 [P.O.:830]  Out: -     Safety Concerns:     { LUANA Safety Concerns:860225359}    Impairments/Disabilities:      { LUANA Impairments/Disabilities:270097600}    Nutrition Therapy:  Current Nutrition Therapy:   { LUANA Diet List:045203791}    Routes of Feeding: {[facility] DME Other Feedings:456511120}  Liquids: {Slp liquid thickness:17658}  Daily Fluid Restriction: {[facility] DME Yes amt example:018528062}  Last Modified Barium Swallow with Video (Video Swallowing Test): {Done Not Done Date:}    Treatments at the Time of Hospital Discharge:   Respiratory Treatments: ***  Oxygen Therapy:  {Therapy; copd oxygen:43589}  Ventilator:    {[facility] Vent List:434108918}    Rehab Therapies: {THERAPEUTIC INTERVENTION:2978937664}  Weight Bearing Status/Restrictions: {[facility] Weight Bearin}  Other Medical Equipment (for information only, NOT a DME order):  {EQUIPMENT:921062014}  Other Treatments: ***    Patient's personal belongings (please select all that are sent with patient):  {[facility] DME Belongings:706342159}    RN SIGNATURE:  {Esignature:817780507}    CASE MANAGEMENT/SOCIAL WORK SECTION    Inpatient Status Date: 2024    Readmission Risk Assessment Score:  Readmission Risk              Risk of Unplanned Readmission:  19           Discharging to Facility/ Agency     [facility]    Address: [address] [place] [address] [place], [place], [place]

## 2024-02-15 NOTE — PROGRESS NOTES
Patient reports CAM monitor came off during shower.  Original monitor discarded and replaced with 2 week CAM monitor # [id number] placed for AFib/stroke .  Instructions given and questions answered.  Bedside nurse aware.

## 2024-02-16 NOTE — PROGRESS NOTES
NEUROCRITICAL CARE PROGRESS NOTE       Patient Name: Berta Oscar YOB: 1975   Sex: Female Age: 48 yrs     CC / Reason for Consult: r/o stroke    Subjective / ROS / Updates over last 24 hours:  No changes - no new symptoms    ASSESSMENT & RECOMMENDATIONS   49yo woman presented to ER with lightheadedness/vertigo, monocular diplopia, and unusual stuttering speech pattern raising concerns for possible stroke and was administered TNK at 11:55 on 12/11  MRI of brain completed and shows no stroke. Symptoms are not entirely explained by a cerebrovascular event, however patient does have several stroke risk factors and would continue ASA / Statin for now.  A1C - 6.5  LDL 97    Plan   Continue ASA d/t patient's many stroke risk factors  Continue statin  Okay for patient to discharge from a neurology standpoint  We will continue to follow. Please call with questions    Management and plan discussed with:  Nursing staff  Dr. Bajwa  Patient & patient's mother     Taisha Marin, PEGGY - [facility]   NEUROCRITICAL CARE  2/13/2024 1:07 PM  [facility]: [facility] NEUROCRITICAL CARE    HISTORY     Allergies Allergies   Allergen Reactions    Lisinopril Cough      Diet ADULT DIET; Regular; 4 carb choices (60 gm/meal)   Isolation No active isolations     LABS   Metabolic Panel Recent Labs     02/11/24  1130 02/12/24  0604 02/13/24  0543    136 138   K 4.0 3.7 3.6    104 105   CO2 27 23 24   BUN 17 11 13   CREATININE 0.9 0.7 0.7   GLUCOSE 112* 166* 209*   CALCIUM 8.7 8.1* 8.0*   LABALBU 4.2 3.7 3.7   PHOS  --  3.0 2.9   MG  --  2.10 2.30   ALKPHOS 99 113 118   ALT 31 28 26   AST 24 19 16        CBC / Coags Recent Labs     02/11/24  1130 02/12/24  0604 02/13/24  0543   WBC 8.0 7.2 6.9   RBC 4.65 4.21 4.20   HGB 13.2 12.3 12.2   HCT 39.6 35.9* 36.7    163 150   INR 0.96  --   --         Other Lab Results   Component Value Date/Time    LABA1C 6.5 02/11/2024 11:30 AM    LDLCALC 97 02/11/2024 11:30 AM    TRIG 
         NEUROCRITICAL CARE PROGRESS NOTE       Patient Name: Berta Oscar YOB: 1975   Sex: Female Age: 48 yrs     CC / Reason for Consult: r/o stroke    Subjective / ROS / Updates over last 24 hours:  Patient doing well this morning, all symptoms have now resolved.     ASSESSMENT & RECOMMENDATIONS   47yo woman presented to ER with lightheadedness/vertigo, monocular diplopia, and unusual stuttering speech pattern raising concerns for possible stroke and was administered TNK at 11:55 on 12/11  MRI of brain pending  Symptoms have now resolved   A1C - 6.5  LDL 97    Plan   MRI brain w/o contrast  24 hour head CT stable   Okay to start ASA  Okay to continue statin  If new stroke on MRI will obtain further stroke w/u.   We will continue to follow. Please call with questions    Management and plan discussed with:  Nursing staff  Dr. Bajwa  Patient    Taisha Marin, PEGGY - [facility]   NEUROCRITICAL CARE  2/12/2024 12:38 PM  [facility]: [facility] NEUROCRITICAL CARE    HISTORY     Allergies Allergies   Allergen Reactions    Lisinopril Cough      Diet ADULT DIET; Regular; 4 carb choices (60 gm/meal)   Isolation No active isolations     LABS   Metabolic Panel Recent Labs     02/11/24  1130 02/12/24  0604    136   K 4.0 3.7    104   CO2 27 23   BUN 17 11   CREATININE 0.9 0.7   GLUCOSE 112* 166*   CALCIUM 8.7 8.1*   LABALBU 4.2 3.7   PHOS  --  3.0   MG  --  2.10   ALKPHOS 99 113   ALT 31 28   AST 24 19      CBC / Coags Recent Labs     02/11/24  1130 02/12/24  0604   WBC 8.0 7.2   RBC 4.65 4.21   HGB 13.2 12.3   HCT 39.6 35.9*    163   INR 0.96  --       Other Lab Results   Component Value Date/Time    LABA1C 6.5 02/11/2024 11:30 AM    LDLCALC 97 02/11/2024 11:30 AM    TRIG 139 02/11/2024 11:30 AM     No results found for: \"PHENYTOIN\", \"KEPPRA\", \"LACOSA\", \"LAMO\", \"VALPROATE\", \"LACTSEPSIS\", \"LACTA\"     CURRENT SCHEDULED MEDICATIONS   Inpatient Medications     labetalol, 200 mg, Oral, BID    sodium 
    Hospitalist Progress Note    Assessment and Plan   Berta Oscar is a 48 y.o. female with history of T2DM, HTN, HLD, and depression who presented on 2/11 with vertigo, slurred speech, and diplopia.     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 She was evaluated in the ED; CT head was unremarkable. She was given TNK. CTA with no LVO. MRI brain unremarkable.   - PT/OT: home with assist PRN   - Discussed with neurology ANAHI. Plan to continue on ASA due to multiple stroke risk factors   - LDL 97, continue atorvastatin   - A1C 6.5; holding home orals while inpatient, on SSI   - cardiac event monitor on discharge    Depression/anxiety  Suicidal ideation  Discussed at length with patient as well as mother separately today. Pt has longstanding history of depression with suicide attempt in her teens (intentional medication overdose, was hospitalized). Reports that she has since been hospitalized in psychiatric facilities ~5 times since then.  She used to follow with a therapist and her psychiatrist but has not been seeing anyone since her insurance has changed with her jobs which to [employer] (started in 12/2023).  Patient states that she has been under significant amount of stress - at risk of losing her job, behind on her bills, does not have a good relationship with her daughter who she lives with.  Protective factors include her other children and her mother who is very supportive, however patient says that lately she has been having more frequent suicidal thoughts.  When asked if she has a plan, she states that she would \"do it the same way she tried before - overdose on medications.\"  Home prescriptions include duloxetine, benzos, wellbutrin, labetalol, Lyrica.  I do not feel that she is safe for discharge home at this time.  She is in agreement to remain inpatient for psychiatric evaluation.  - Psychiatry consult   - Continue 
  Current NIHSS 0    Nursing Core Measures for Stroke:   [x]   Education template documentation (STROKE/TIA). Please select only risk factors that are applicable to patient when selecting risk factors.  [x]   Care Plan template documentation (Physiologic Instability - Neurosensory). Selecting this will add care plan rows to the flowsheet under the Neuro section of Head to Toe.  [x]   Verified Swallow Screen completed prior to PO intake of food, drink, medications>          Please verify correct medication route prior to administration for intubated patients, patients who can not swallow or have alternative routes of intake (NG, OG, IA), etc  [x]   VTE Prophylaxis: SCDs ordered/addressed; SCDs: On           (As a reminder, ASA, Plavix, and TPA/TNK are not VTE prophylaxis.)    Reviewed the Following Education with Patient and/or Family:   - Personalized risk factors for patient, along with changes, modifications that will help prevent stroke.  - Signs and Symptoms of Stroke: (Facial droop, weakness/numbness especially on one side, speech difficulty, sudden confusion, sudden loss of vision, sudden severe headache, sudden loss of balance or having difficulty walking, syncope, or seizure)  - How to activate EMS (911)   - Importance of Follow Up Appointments at Discharge   - Importance of Compliance with Medications Prescribed at Discharge  - Available community resources and stroke advocacy groups if needed    Patient and/or family member: verbalized understanding.     Stroke Education booklet given to patient/family (or verified, if given already), which reviews above information. yes         Electronically signed by Selina Abbott RN on 2/13/2024 at 2:27 AM   
  Current NIHSS 0    Nursing Core Measures for Stroke:   [x]   Education template documentation (STROKE/TIA). Please select only risk factors that are applicable to patient when selecting risk factors.  [x]   Care Plan template documentation (Physiologic Instability - Neurosensory). Selecting this will add care plan rows to the flowsheet under the Neuro section of Head to Toe.  [x]   Verified Swallow Screen completed prior to PO intake of food, drink, medications>          Please verify correct medication route prior to administration for intubated patients, patients who can not swallow or have alternative routes of intake (NG, OG, NM), etc  [x]   VTE Prophylaxis: SCDs ordered/addressed; SCDs: On           (As a reminder, ASA, Plavix, and TPA/TNK are not VTE prophylaxis.)    Reviewed the Following Education with Patient and/or Family:   - Personalized risk factors for patient, along with changes, modifications that will help prevent stroke.  - Signs and Symptoms of Stroke: (Facial droop, weakness/numbness especially on one side, speech difficulty, sudden confusion, sudden loss of vision, sudden severe headache, sudden loss of balance or having difficulty walking, syncope, or seizure)  - How to activate EMS (911)   - Importance of Follow Up Appointments at Discharge   - Importance of Compliance with Medications Prescribed at Discharge  - Available community resources and stroke advocacy groups if needed    Patient and/or family member: verbalized understanding.     Stroke Education booklet given to patient/family (or verified, if given already), which reviews above information. yes         Electronically signed by Selina Abbott RN on 2/12/2024 at 4:36 AM   
  Physician Progress Note      PATIENT:               SHANA MELISSA  [facility] #:                  [id number]  :                       1975  ADMIT DATE:       2024 11:03 AM  DISCH DATE:  RESPONDING  PROVIDER #:        Marlena Brar MD          QUERY TEXT:    Pt admitted  with vertigo, slurred speech, diplopia, recieved TPA in ED   with noted resolution of symptoms. Noted to have stress, anxiety, depression,   DM. If possible, please document in discharge summary if you are evaluating   and /or treating any of the following:    The medical record reflects the following:  Risk Factors: Stress, anxiety, DM  Clinical Indicators: Per ED Triage \"suddenly felt very dizzy and lightheaded,   BS 150s, the floor reports BP was low with systolic in the 80s\". Neuro c/s    \"stuttering speech is not neurologic and not due to stroke, Diplopia due   to stroke (extraocular muscle involvement) would be gone with either eye   covered, Stroke to her eyeball would lead to vision loss, not diplopia.   Despite her risk factors, her age makes stroke also less likely, but does not   exclude it\" and  \"Symptoms are not entirely explained by a cerebrovascular   event\". Psych c/s \" endorses depression and suic  Treatment: TPA in ED, Sched ASA, Lipitor, Lyrica, SSI, Wellbutrin, Klonopin,   Cymbalta, CT/CTA/ MRI, A1C, Neuro/ Psych c/s, Ortho vs, cardiac event monitor   on discharge  Options provided:  -- Vertigo, slurred speech, diplopia suspected due to Anxiety  -- Vertigo, slurred speech, diplopia suspected due to Diabetes with autonomic   neuropathy  -- Vertigo, slurred speech, diplopia due to aborted CVA  -- Vertigo, slurred speech, diplopia suspected due to, Please specify  -- Other - I will add my own diagnosis  -- Disagree - Not applicable / Not valid  -- Disagree - Clinically unable to determine / Unknown  -- Refer to Clinical Documentation Reviewer    PROVIDER RESPONSE TEXT:    TIA and/or anxiety    Query created by: 
4 Eyes Skin Assessment     NAME:  Berta Oscar  YOB: 1975  MEDICAL RECORD NUMBER:  [id number]    The patient is being assessed for  Transfer      I agree that at least one RN has performed a thorough Head to Toe Skin Assessment on the patient. ALL assessment sites listed below have been assessed.      Areas assessed by both nurses:    Head, Face, Ears, Shoulders, Back, Chest, Arms, Elbows, Hands, Sacrum. Buttock, Coccyx, Ischium, and Legs. Feet and Heels        Does the Patient have a Wound? No noted wound(s)       Yony Prevention initiated by RN: No  Wound Care Orders initiated by RN: No    Pressure Injury (Stage 3,4, Unstageable, DTI, NWPT, and Complex wounds) if present, place Wound referral order by RN under : No    New Ostomies, if present place, Ostomy referral order under : No     Nurse 1 eSignature: Electronically signed by Danii Cummings RN on 2/13/24 at 4:39 PM EST    **SHARE this note so that the co-signing nurse can place an eSignature**    Nurse 2 eSignature: Electronically signed by Izabella Cummings RN on 2/13/24 at 4:46 PM EST    
All personal belongings sent home with patients mother, ok with patient.   
D/C order noted. SL and tele removed. Report called to Lizbeth at [facility]. Pt also given copy of AVS per her request. Pt awaiting transport at 1945.  
Orthostatic vitals following losartan dose per order:     Lying: /76, O2 93, Pulse 97, Resp 22  Sitting: /82, O2 93, Pulse 93, Resp 16  Standing: /73, O2 95, Pulse 93, Resp 16  
Pt to MRI. MRI screening completed. Ok to transport without RN per Neurocritical Care.   
Pt transferred to 4629. Report given, all questions answered at this time. VSS. All belongings accounted for.   
Retrieve pt from ED/ Alert oriented/ moving all extremities strong/ speech slurred/ hoarse pt continues to have double vision in the right eye only/ same as before / continues to have some dizziness states it is there but better/ passed  swallow/ pt is hungry/ will see if we can advance diet/ see orders/ notes/ flow sheet    1600 no longer   slur present/ pt talking normal/ pt continues to have in her words still having double vision in right eye but seems to be  getting better/ see flow sheet  
See discharge summary dated 2/14/2024. Pt was discharged on 2/14, remains in the hospital awaiting inpatient psych placement. Medically ready for discharge, planning transfer today 2/15.  
   Stress incontinence              Past Surgical History         Past Surgical History:   Procedure Laterality Date    BUNIONECTOMY                SocialHistory:   The patient lives at     Alcohol:  Illicit drugs: no use  Tobacco:       Family History:  Family History     Medications:     Scheduled Meds:   labetalol  200 mg Oral BID    sodium chloride flush  5-40 mL IntraVENous 2 times per day    buPROPion  300 mg Oral Daily    DULoxetine  60 mg Oral BID    montelukast  10 mg Oral Nightly    pantoprazole  40 mg Oral QAM AC    pregabalin  200 mg Oral TID    atorvastatin  80 mg Oral Nightly    insulin lispro  0-4 Units SubCUTAneous TID WC    insulin lispro  0-4 Units SubCUTAneous Nightly    clonazePAM  1 mg Oral Q12H     Continuous Infusions:   sodium chloride      dextrose       PRN Meds:phenol, dextrose bolus, sodium chloride flush, sodium chloride, ondansetron, melatonin, glucose, dextrose bolus **OR** dextrose bolus, glucagon (rDNA), dextrose, Benzocaine-Menthol    Objective:   Vitals:   T-max:  Patient Vitals for the past 8 hrs:   BP Temp Temp src Pulse Resp SpO2   02/12/24 0900 (!) 178/91 -- -- 97 17 97 %   02/12/24 0840 (!) 170/88 -- -- 93 -- --   02/12/24 0800 (!) 170/109 98 °F (36.7 °C) Oral 99 28 97 %   02/12/24 0700 (!) 160/87 -- -- 94 16 97 %   02/12/24 0600 (!) 172/90 -- -- 90 22 98 %   02/12/24 0500 (!) 140/81 -- -- 85 16 98 %   02/12/24 0400 (!) 149/85 -- -- 85 16 96 %   02/12/24 0359 -- 98.3 °F (36.8 °C) Oral -- -- --   02/12/24 0300 (!) 141/85 -- -- 81 17 94 %   02/12/24 0200 (!) 144/83 -- -- 86 18 93 %       Intake/Output Summary (Last 24 hours) at 2/12/2024 0930  Last data filed at 2/12/2024 0900  Gross per 24 hour   Intake 1770 ml   Output 6300 ml   Net -4530 ml       Review of Systems   Constitutional:  Negative for activity change, chills, fatigue and fever.   HENT:  Negative for sneezing, sore throat, trouble swallowing and voice change.    Eyes:  Negative for photophobia, pain, redness and 
(progressed to mod I)  Quality of Gait: slow, steady gait with no LOB noted  Distance: 200 ft, 20 ft  Comments: Pt reports R LE still feels a little \"off\" but no concerns about balance/safety.  Stairs/Curb  Stairs?:  (up/down 4 steps with rail and supervision; pt demo reciprocal gait pattern without issue)     Balance  Sitting - Static: Good  Sitting - Dynamic: Good  Standing - Static: Good  Standing - Dynamic: Good                 AM-PAC - Mobility    AM-PAC Basic Mobility - Inpatient   How much help is needed turning from your back to your side while in a flat bed without using bedrails?: None  How much help is needed moving from lying on your back to sitting on the side of a flat bed without using bedrails?: None  How much help is needed moving to and from a bed to a chair?: None  How much help is needed standing up from a chair using your arms?: None  How much help is needed walking in hospital room?: None  How much help is needed climbing 3-5 steps with a railing?: None  AM-PAC Inpatient Mobility Raw Score : 24  AM-PAC Inpatient T-Scale Score : 61.14  Mobility Inpatient CMS 0-100% Score: 0  Mobility Inpatient CMS G-Code Modifier : CH           Education  Patient Education  Education Given To: Patient  Education Provided: Role of Therapy  Education Method: Verbal  Barriers to Learning: None  Education Outcome: Verbalized understanding      Therapy Time   Individual Concurrent Group Co-treatment   Time In 0853         Time Out 0931         Minutes 38          Timed Code Treatment Minutes: 28      Total Treatment Minutes:   38       Amber Oleary, PT         
02/12/24  0604    136   K 4.0 3.7    104   CO2 27 23   BUN 17 11   CREATININE 0.9 0.7   GLUCOSE 112* 166*     Hepatic:   Recent Labs     02/11/24  1130 02/12/24  0604   AST 24 19   ALT 31 28   BILITOT 1.2* 0.6   ALKPHOS 99 113     Lipids:   Lab Results   Component Value Date/Time    CHOL 170 02/11/2024 11:30 AM    HDL 45 02/11/2024 11:30 AM    TRIG 139 02/11/2024 11:30 AM     Hemoglobin A1C: No results found for: \"LABA1C\"  TSH: No results found for: \"TSH\"  Troponin: No results found for: \"TROPONINT\"  Lactic Acid: No results for input(s): \"LACTA\" in the last 72 hours.  BNP: No results for input(s): \"PROBNP\" in the last 72 hours.  UA:  Lab Results   Component Value Date/Time    NITRU Negative 01/24/2024 03:10 PM    COLORU Yellow 01/24/2024 03:10 PM    PHUR 6.0 01/24/2024 03:10 PM    WBCUA 0-2 01/24/2024 03:10 PM    RBCUA 0-2 01/24/2024 03:10 PM    BACTERIA Rare 01/24/2024 03:10 PM    CLARITYU Clear 01/24/2024 03:10 PM    SPECGRAV 1.020 01/24/2024 03:10 PM    LEUKOCYTESUR Negative 01/24/2024 03:10 PM    UROBILINOGEN 0.2 01/24/2024 03:10 PM    BILIRUBINUR Negative 01/24/2024 03:10 PM    BLOODU Negative 01/24/2024 03:10 PM    GLUCOSEU 500 01/24/2024 03:10 PM    KETUA Negative 01/24/2024 03:10 PM     Urine Cultures: No results found for: \"LABURIN\"  Blood Cultures: No results found for: \"BC\"  No results found for: \"BLOODCULT2\"  Organism: No results found for: \"ORG\"      Electronically signed by Rafa Valentin MD on 2/12/2024 at 8:19 AM    
Outcome: Verbalized understanding;Demonstrated understanding                   AM-PAC - ADL  AM-PAC Daily Activity - Inpatient   How much help is needed for putting on and taking off regular lower body clothing?: None  How much help is needed for bathing (which includes washing, rinsing, drying)?: None  How much help is needed for toileting (which includes using toilet, bedpan, or urinal)?: None  How much help is needed for putting on and taking off regular upper body clothing?: None  How much help is needed for taking care of personal grooming?: None  How much help for eating meals?: None  AM-[facility] Inpatient Daily Activity Raw Score: 24  AM-PAC Inpatient ADL T-Scale Score : 57.54  ADL Inpatient CMS 0-100% Score: 0  ADL Inpatient CMS G-Code Modifier : CH      Therapy Time   Individual Concurrent Group Co-treatment   Time In 0853         Time Out 0931         Minutes 38         Timed Code Treatment Minutes: 23 Minutes (+15min eval)       Carissa pozo, OT

## 2024-02-19 ENCOUNTER — CARE COORDINATION (OUTPATIENT)
Dept: OTHER | Facility: CLINIC | Age: 49
End: 2024-02-19

## 2024-02-19 NOTE — CARE COORDINATION
Per R census, patient admitted to [facility] on 2/15/23 and remains IP. Will follow for d/c.      Kaila Juares LPN- Care Coordinator  Associate Care Management  [address] [place] [address]  [address]  Phone: [phone]  [email or web address]

## 2024-02-23 ENCOUNTER — CARE COORDINATION (OUTPATIENT)
Dept: OTHER | Facility: CLINIC | Age: 49
End: 2024-02-23

## 2024-02-23 SDOH — SOCIAL STABILITY: SOCIAL NETWORK: HOW OFTEN DO YOU ATTENT MEETINGS OF THE CLUB OR ORGANIZATION YOU BELONG TO?: NEVER

## 2024-02-23 SDOH — HEALTH STABILITY: PHYSICAL HEALTH: ON AVERAGE, HOW MANY MINUTES DO YOU ENGAGE IN EXERCISE AT THIS LEVEL?: 0 MIN

## 2024-02-23 SDOH — SOCIAL STABILITY: SOCIAL NETWORK: IN A TYPICAL WEEK, HOW MANY TIMES DO YOU TALK ON THE PHONE WITH FAMILY, FRIENDS, OR NEIGHBORS?: ONCE A WEEK

## 2024-02-23 SDOH — SOCIAL STABILITY: SOCIAL INSECURITY: WITHIN THE LAST YEAR, HAVE YOU BEEN AFRAID OF YOUR PARTNER OR EX-PARTNER?: NO

## 2024-02-23 SDOH — SOCIAL STABILITY: SOCIAL NETWORK: ARE YOU MARRIED, WIDOWED, DIVORCED, SEPARATED, NEVER MARRIED, OR LIVING WITH A PARTNER?: PATIENT DECLINED

## 2024-02-23 SDOH — ECONOMIC STABILITY: HOUSING INSECURITY

## 2024-02-23 SDOH — SOCIAL STABILITY: SOCIAL NETWORK: HOW OFTEN DO YOU ATTEND CHURCH OR RELIGIOUS SERVICES?: NEVER

## 2024-02-23 SDOH — HEALTH STABILITY: PHYSICAL HEALTH: ON AVERAGE, HOW MANY DAYS PER WEEK DO YOU ENGAGE IN MODERATE TO STRENUOUS EXERCISE (LIKE A BRISK WALK)?: 0 DAYS

## 2024-02-23 SDOH — SOCIAL STABILITY: SOCIAL NETWORK: ARE YOU MARRIED, WIDOWED, DIVORCED, SEPARATED, NEVER MARRIED, OR LIVING WITH A PARTNER?: NEVER MARRIED

## 2024-02-23 SDOH — ECONOMIC STABILITY: FOOD INSECURITY: WITHIN THE PAST 12 MONTHS, THE FOOD YOU BOUGHT JUST DIDN'T LAST AND YOU DIDN'T HAVE MONEY TO GET MORE.: SOMETIMES TRUE

## 2024-02-23 SDOH — HEALTH STABILITY: MENTAL HEALTH
STRESS IS WHEN SOMEONE FEELS TENSE, NERVOUS, ANXIOUS, OR CAN'T SLEEP AT NIGHT BECAUSE THEIR MIND IS TROUBLED. HOW STRESSED ARE YOU?: RATHER MUCH

## 2024-02-23 SDOH — ECONOMIC STABILITY: INCOME INSECURITY: HOW HARD IS IT FOR YOU TO PAY FOR THE VERY BASICS LIKE FOOD, HOUSING, MEDICAL CARE, AND HEATING?: HARD

## 2024-02-23 SDOH — SOCIAL STABILITY: SOCIAL NETWORK: HOW OFTEN DO YOU GET TOGETHER WITH FRIENDS OR RELATIVES?: ONCE A WEEK

## 2024-02-23 SDOH — SOCIAL STABILITY: SOCIAL NETWORK
DO YOU BELONG TO ANY CLUBS OR ORGANIZATIONS SUCH AS CHURCH GROUPS UNIONS, FRATERNAL OR ATHLETIC GROUPS, OR SCHOOL GROUPS?: NO

## 2024-02-23 SDOH — SOCIAL STABILITY: SOCIAL INSECURITY: WITHIN THE LAST YEAR, HAVE YOU BEEN HUMILIATED OR EMOTIONALLY ABUSED IN OTHER WAYS BY YOUR PARTNER OR EX-PARTNER?: NO

## 2024-02-23 SDOH — ECONOMIC STABILITY: FOOD INSECURITY: WITHIN THE PAST 12 MONTHS, YOU WORRIED THAT YOUR FOOD WOULD RUN OUT BEFORE YOU GOT MONEY TO BUY MORE.: SOMETIMES TRUE

## 2024-02-23 SDOH — SOCIAL STABILITY: SOCIAL NETWORK: IN A TYPICAL WEEK, HOW MANY TIMES DO YOU TALK ON THE PHONE WITH FAMILY, FRIENDS, OR NEIGHBORS?: TWICE A WEEK

## 2024-02-23 SDOH — SOCIAL STABILITY: SOCIAL INSECURITY
WITHIN THE LAST YEAR, HAVE TO BEEN RAPED OR FORCED TO HAVE ANY KIND OF SEXUAL ACTIVITY BY YOUR PARTNER OR EX-PARTNER?: NO

## 2024-02-23 SDOH — ECONOMIC STABILITY: TRANSPORTATION INSECURITY
IN THE PAST 12 MONTHS, HAS THE LACK OF TRANSPORTATION KEPT YOU FROM MEDICAL APPOINTMENTS OR FROM GETTING MEDICATIONS?: NO

## 2024-02-23 SDOH — SOCIAL STABILITY: SOCIAL INSECURITY
WITHIN THE LAST YEAR, HAVE YOU BEEN KICKED, HIT, SLAPPED, OR OTHERWISE PHYSICALLY HURT BY YOUR PARTNER OR EX-PARTNER?: NO

## 2024-02-23 SDOH — ECONOMIC STABILITY: INCOME INSECURITY: IN THE LAST 12 MONTHS, WAS THERE A TIME WHEN YOU WERE NOT ABLE TO PAY THE MORTGAGE OR RENT ON TIME?: YES

## 2024-02-23 SDOH — ECONOMIC STABILITY: TRANSPORTATION INSECURITY
IN THE PAST 12 MONTHS, HAS LACK OF TRANSPORTATION KEPT YOU FROM MEETINGS, WORK, OR FROM GETTING THINGS NEEDED FOR DAILY LIVING?: NO

## 2024-02-23 SDOH — ECONOMIC STABILITY: HOUSING INSECURITY
IN THE LAST 12 MONTHS, WAS THERE A TIME WHEN YOU DID NOT HAVE A STEADY PLACE TO SLEEP OR SLEPT IN A SHELTER (INCLUDING NOW)?: NO

## 2024-02-23 NOTE — CARE COORDINATION
Ambulatory Care Coordination Note  2024    Patient Current Location:  Home: [id number]  [facility] [place] [facility] [id number]    ACM contacted the patient by telephone. Verified name and  with patient as identifiers. Provided introduction to self, and explanation of the ACM role.     ACM: Kaila Juares LPN    Challenges to be reviewed by the provider   Additional needs identified to be addressed with provider: No  none               Method of communication with provider: none.    Spoke to patient, intro to  ACM. Patient expressed she has a lot of stress going on currently. Was d/c from [facility] last week. Lives with daughter, not a good relationship. Will need to find a new place to live very soon. Having trouble paying bills. 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 Has appt today with PCP. Plans to discuss this. Is meeting with her manager on Monday to discuss if she still has a job.    Her support system is lacking, has one friend, mother is also not a positive relationship.     Referral to ACM SW agreed on by patient. Will need help finding affordable housing and assistance paying bills.     Patient has a long hx of sinusitis, current episode lasting 1.5+ months. Has tried multiple ATBs with no relief. She is a diabetic, cannot take steroids. She sees  pulmonology for sleep management (has CPAP). Adv to discuss with pulm and PCP.    She has also been having issues with hr R eye, double vision, feels odd. Has an eye dr appt to recheck in a few weeks. All began  when she presented to the ER with stroke like s/s. MRI was WNL, did get a CAM monitor- going to get removed on 24 to [facility] in [place].  Adv her that when she drops it off, they will set up an appt for her.     She is in need of a Psychiatrist and counselor. She was given a name of a counselor when she was d/c from [facility]

## 2024-02-23 NOTE — CARE COORDINATION
Ambulatory Care Coordination Note    ACM attempted to reach patient for introduction to Associate Care Management related to recent discharge, finding in-network providers, . HIPAA compliant message left requesting a return phone call at patient convenience.     Plan for follow-up call in 1-2 days      No future appointments.    Kaila Juares LPN- Care Coordinator  Associate Care Management  [address], [place]  [address]  Phone: [phone]  [email or web address]

## 2024-02-26 ENCOUNTER — CARE COORDINATION (OUTPATIENT)
Dept: OTHER | Facility: CLINIC | Age: 49
End: 2024-02-26

## 2024-02-26 NOTE — CARE COORDINATION
MSW contacted patient for evaluation .  Patient identifiers confirmed, zip code and .  Referral sent to  by Nurse NACHO, Kaila Juares LPN.      Purpose of TC  MSW received referral reporting patient recently discharged from [facility] and residing with daughter but has been asked to move out-patient additionally with financial hardships related to affording housing. Request for MSW to outreach to discuss and explore for affordable housing and resources to help with paying bills and provide to patient as/if located and appropriate.     Patient concerns  Patient reports their biggest concern is having their job re-instated, request for resignation withdrawal made and RTW paperwork at provider office for completion.   Patient additionally shared housing concerns: planning to move out at end of lease 3/28/24. Currently on lease with daughter but relationship is strained with patient reporting she facilitated connection with daughter and community agency to assist daughter with house hunting, job searching, and applying for Medicaid, SNAP and other resources as appropriate--patient unsure if daughter is following up with them. Daughter is 25 years old, not under patient's insurance. Patient with concern regarding affording security deposit and down payment next month when lease ends-patient viewing potential apartment today. Current apartment: $1250 a month plus utilities; apartment visiting today: $1300 a month with water and  included, patient only to play electric--patient shared when working as normal able to afford without assistance, just with concern at this time due to being on VERONICA.     Transportation  No assistance needed, has own vehicle.     Medications  Primary UMR; Secondary [employer]. Patient shared will be losing Medicaid benefits after this month due to over income, but reached UMR deductible and [facility]. No assistance needs. Patient utilizes [employer].

## 2024-02-26 NOTE — CARE COORDINATION
MSW received call from patient reporting PCP will be out of town until next Monday 3/4/24-so unable to complete RTW paperwork until then. Patient reports [facility] stated either PCP or discharging hospital would need to complete. Patient outreached to [facility]  Jessica ([phone]) and left voicemail. Patient outreaching to this worker at this time to request this worker also outreach to Jessica to request assistance with completion. Patient request for this worker's email, email provided, and patient sent to this worker the forms needed for completion should this worker reach Jessica and need to forward-patient aware this worker unable to fax.     MSW Outreach to NEY PollardJessica with [facility]-unable to reach, discreet voicemail left requesting return call.    MSW returned call to patient to update: unable to reach, will update patient if able to reach today otherwise patient encouraged and plan to outreach next day to facility for assistance.

## 2024-02-26 NOTE — CARE COORDINATION
MSW outreach to patient via [employer] to provide resources: [employer] [facility] + required budgeting activity sheet, [employer].     MSW received return call from Jonathan with [facility]-this worker explained patient request and forwarded paperwork provided by patient to confirmed email of: [email or web address]    Jonathan to follow up to see if able to complete and will fax if able.     Patient updated via phone.

## 2024-02-27 ENCOUNTER — CARE COORDINATION (OUTPATIENT)
Dept: OTHER | Facility: CLINIC | Age: 49
End: 2024-02-27

## 2024-02-27 NOTE — CARE COORDINATION
24 Care Coordination  Note    Care Coordination  (CCSS) received referral from [facility] requesting Assistance with finding providers. yes - Scheduling appointments .     [facility] [phone]     CCSS contacted Provider office, [facility]  via phone, verified name and  with Carolyn as identifiers.  CCSS conference the patient in on the call    Summary Note:   Dr. Muniz is not accepting patients at this time.  Currently, they only have telehealth counseling available.  The patient agreed to the telehealth appointment.  Carolyn stated that the patient is already in their system.  She had an appointment in 2019 but never followed up.  Patient stated that she has an appointment scheduled already / mood disorder center with .  She was advised UC is OON.    CASH Mae  Next Wed 3/6 at 5 p.m.    Mother's address  [address] [place] [address]  CCSS and patient were transferred to psychiatry.  Patient left them a vm.   CCSS contacted [facility] via phone  verified patient's name and  with Albert. Do they bill under the provider or [facility].  Need to ensure they are INN.   They bill under Valarie Santoro NPI   [id number] and tax id [id number]    [facility] Portal       CCSS contacted the patient via phone.  Left a vm.              [facility] Find a Provider Tool       Plan:  Chart routed to [facility] for review.   CCSS Plan for follow-up call in 1-2 days       Dr. Graeme [email or web address]  [address], [address], [place] [address] [phone]

## 2024-02-28 ENCOUNTER — CARE COORDINATION (OUTPATIENT)
Dept: OTHER | Facility: CLINIC | Age: 49
End: 2024-02-28

## 2024-02-28 NOTE — CARE COORDINATION
Ambulatory Care Coordination Note    ACM attempted to reach patient for follow up call regarding see below. HIPAA compliant message left requesting a return phone call at patient convenience.     - Job status, paperwork to RTW  - Sinus issues (pulm)  - R eye issues- UC Neuro?  - Tomorrow CAM drop off at [facility]  - Meds filled?  - [facility] 3/6 therapy  - Psychiatry?      Kaila Juares LPN- Care Coordinator  Associate Care Management  [address] [place] [address]  [address]  Phone: [phone]  [email or web address]

## 2024-02-28 NOTE — CARE COORDINATION
24 Care Coordination  Note    Care Coordination  (CCSS), Danya Fletcher, [facility],  requesting Assistance with finding providers. yes - Scheduling appointment .     CCSS contacted patient via phone, verified name and  with patient as identifiers.    Summary Note:   Patient completed the registration but didn't include all medications.  Patient stated that she had to remove her heart monitor.  It was itching.  Found a blister there.   This CCSS will let Kaila GRIFFITH, know.  Was supposed to keep it on until tomorrow.  CCSS did a conference call with [facility] and the patient to schedule appointment for psychiatry.  Patient left another .       Plan:  Chart routed to [facility] for review.   CCSS Plan for follow-up call in 3-5 days

## 2024-03-04 ENCOUNTER — TELEPHONE (OUTPATIENT)
Dept: ORTHOPEDIC SURGERY | Age: 49
End: 2024-03-04

## 2024-03-04 ENCOUNTER — CARE COORDINATION (OUTPATIENT)
Dept: OTHER | Facility: CLINIC | Age: 49
End: 2024-03-04

## 2024-03-04 NOTE — TELEPHONE ENCOUNTER
Called patient informed her that dr. Peng has emergency surgery and need to move her appointment up. Will be there at 2.

## 2024-03-05 ENCOUNTER — CARE COORDINATION (OUTPATIENT)
Dept: OTHER | Facility: CLINIC | Age: 49
End: 2024-03-05

## 2024-03-05 NOTE — CARE COORDINATION
3/5/24 Care Coordination  Note    Care Coordination  (CCSS),, Danya Fletcher, received referral from [facility], Kaila Juares RN, requesting assistance with Assistance with finding providers. yes - F/U on appointment with [facility] .     CCSS contacted patient via phone, verified name and  with patient as identifiers.    Summary Note:   The psychiatry appointment still hasn't been scheduled.  Advised the patient to mention it during her telehealth appointment tomorrow morning.  She agreed and will keep this CCSS posted.  She will also provide her new address.    CASH Mae  Wed 3/6 at 5 p.m.    Mother's address  [address] [place] [id number]     Plan:  Chart routed to [facility] for review.   CCSS Plan for follow-up call in 1-2 days

## 2024-03-06 ENCOUNTER — CARE COORDINATION (OUTPATIENT)
Dept: OTHER | Facility: CLINIC | Age: 49
End: 2024-03-06

## 2024-03-06 NOTE — CARE COORDINATION
Ambulatory Care Coordination Note    ACM attempted to reach patient for follow up call regarding see below. HIPAA compliant message left requesting a return phone call at patient convenience.     - Needs to schedule Psych  - Sinus issues (pulm)  - R eye issues- UC Neuro?  - Tomorrow CAM drop off at [facility]  - Meds filled?  - [facility] 3/6 therapy        Kaila Juares LPN- Care Coordinator  Associate Care Management  [address] [place] [address]  [address]  Phone: [phone]  [email or web address]

## 2024-03-07 ENCOUNTER — CARE COORDINATION (OUTPATIENT)
Dept: OTHER | Facility: CLINIC | Age: 49
End: 2024-03-07

## 2024-03-07 NOTE — CARE COORDINATION
Ambulatory Care Coordination Note    ACM attempted to reach patient for follow up call regarding see below. HIPAA compliant message left requesting a return phone call at patient convenience.     - Needs to schedule Psychiatry  - Needs to reschedule counseling at [facility]  - Sinus issues (pulm)  - R eye issues- UC Neuro?  - [facility]- needs to schedule [phone]      Kaila Juares LPN- Care Coordinator  Associate Care Management  [address] [place] [address]  Phone: [phone]  [email or web address]

## 2024-03-07 NOTE — CARE COORDINATION
Care Coordination  Note    Care Coordination  (CCSS), Danya Fletcher, received referral from [facility], Kaila Juares RN, requesting Assistance with appointment scheduling or follow-up.   yes - Patient provided updates .     CCSS was contacted by patient via phone for follow up on referral listed above. verified name and  with patient as identifiers.    Summary Note:    The patient stated that she mailed in her cardiac monitor & she hasn't heard  anything back from anyone.  Her f/u appointment with neurology hasn't been scheduled.   Therapy appointment was cancelled due to paperwork being incomplete.  She will work on it today.      Plan:  Chart routed to [facility] for review.   CCSS Plan for follow-up call in 3-5 days

## 2024-03-07 NOTE — CARE COORDINATION
MSW Documentation    MSW contacted patient for follow up.  Patient identifiers confirmed, zip code and .  Patient referred by Nurse NACHO, Kaila Juares LPN.      Purpose of TC  MSW received voicemail reporting did not receive HSA card in mail-request for this worker to return call.    TC Details  Patient reports outreached to [facility] and was informed unable to locate patient for HSA card-patient stated that happened previously but after several transfers were told it would be sent to her. This worker recommended to contact associate services regarding and patient with plan to do so when able.    Patient shared [employer] Medicaid still active and will update on change in income to remain active; patient reports additional plan to update to potentially receive SNAP benefits as they previously did, only ending due to increased income with [facility]. Patient encouraged to update.   Patient shared resignation accepted by [facility] and reportedly to be on paid admin leave from dates -. Patient outreached today regarding inquiry into when/amount of payments to be received and stated they were instructed that HR will reach out with further information.    Patient reported apartment [facility] is not allowing lease renewal and anticipated move out date of 3/28/24. Patient has already located another apartment and is packing her belongings. Patient shared outreached to [facility] benefit of legal to discuss issues with current landlord and what tenant rights are. Patient stated that she received notification on previous day that an inspection of the premises to occur tomorrow 3/8 and patient with upset regarding due to limited time to prepare. Patient additionally reported they have ants in this residence and landlord is not pest spraying at this time. Patient elaborated that they feel targeted by the landlord and feel they should be given an explanation as to why complex is not renewing lease and interested in

## 2024-03-08 ENCOUNTER — CARE COORDINATION (OUTPATIENT)
Dept: OTHER | Facility: CLINIC | Age: 49
End: 2024-03-08

## 2024-03-08 NOTE — CARE COORDINATION
Ambulatory Care Coordination Note    ACM attempted to reach patient for follow up call regarding see below. HIPAA compliant message left requesting a return phone call at patient convenience.     - Needs to schedule Psychiatry  - Needs to reschedule counseling at [facility]  - Sinus issues (pulm)  - R eye issues-  Neuro?  - [facility]- needs to schedule [phone]  - PCP referred patient to  Neuro and  Stroke Clinic. Patient may consider going here as her UMR insurance has termed.      Kaila Juares LPN- Care Coordinator  Associate Care Management  [address] [place] [address]  [address]  Phone: [phone]  [email or web address]

## 2024-03-11 SDOH — HEALTH STABILITY: PHYSICAL HEALTH: ON AVERAGE, HOW MANY MINUTES DO YOU ENGAGE IN EXERCISE AT THIS LEVEL?: 20 MIN

## 2024-03-11 SDOH — HEALTH STABILITY: PHYSICAL HEALTH: ON AVERAGE, HOW MANY DAYS PER WEEK DO YOU ENGAGE IN MODERATE TO STRENUOUS EXERCISE (LIKE A BRISK WALK)?: 3 DAYS

## 2024-03-12 ENCOUNTER — CARE COORDINATION (OUTPATIENT)
Dept: OTHER | Facility: CLINIC | Age: 49
End: 2024-03-12

## 2024-03-12 NOTE — CARE COORDINATION
MSW Documentation    MSW contacted patient for follow up.  Patient identifiers confirmed, zip code and .  Patient referred by Nurse NACHO, Kaila Juares LPN.        Purpose of TC  MSW outreach to follow up on previous discussions and resources provided.     TC Details  Patient reports they were up late and are still in bed but able to talk-call kept brief for patient convenience.  Patient stated informed will be paid 80 hours of admin leave-anticipated to be on check this Friday 3/15/24.   Patient yet to  services regarding HSA card-reports will do so.   No further SW needs identified at this time.      Plan of action  MSW to outreach again, patient in agreement with this plan.

## 2024-03-14 ENCOUNTER — OFFICE VISIT (OUTPATIENT)
Dept: ORTHOPEDIC SURGERY | Age: 49
End: 2024-03-14
Payer: COMMERCIAL

## 2024-03-14 VITALS — WEIGHT: 268 LBS | BODY MASS INDEX: 40.62 KG/M2 | HEIGHT: 68 IN

## 2024-03-14 DIAGNOSIS — M25.562 LEFT KNEE PAIN, UNSPECIFIED CHRONICITY: ICD-10-CM

## 2024-03-14 DIAGNOSIS — M25.561 RIGHT KNEE PAIN, UNSPECIFIED CHRONICITY: Primary | ICD-10-CM

## 2024-03-14 PROCEDURE — G8428 CUR MEDS NOT DOCUMENT: HCPCS | Performed by: ORTHOPAEDIC SURGERY

## 2024-03-14 PROCEDURE — G8484 FLU IMMUNIZE NO ADMIN: HCPCS | Performed by: ORTHOPAEDIC SURGERY

## 2024-03-14 PROCEDURE — 1036F TOBACCO NON-USER: CPT | Performed by: ORTHOPAEDIC SURGERY

## 2024-03-14 PROCEDURE — 99203 OFFICE O/P NEW LOW 30 MIN: CPT | Performed by: ORTHOPAEDIC SURGERY

## 2024-03-14 PROCEDURE — G8419 CALC BMI OUT NRM PARAM NOF/U: HCPCS | Performed by: ORTHOPAEDIC SURGERY

## 2024-03-14 PROCEDURE — 1111F DSCHRG MED/CURRENT MED MERGE: CPT | Performed by: ORTHOPAEDIC SURGERY

## 2024-03-15 ENCOUNTER — CARE COORDINATION (OUTPATIENT)
Dept: OTHER | Facility: CLINIC | Age: 49
End: 2024-03-15

## 2024-03-15 DIAGNOSIS — I48.91 ATRIAL FIBRILLATION, UNSPECIFIED TYPE (HCC): Primary | ICD-10-CM

## 2024-03-15 DIAGNOSIS — R29.90 STROKE-LIKE SYMPTOM: ICD-10-CM

## 2024-03-15 NOTE — CARE COORDINATION
Ambulatory Care Coordination Note    ACM attempted to reach patient for follow up call regarding see below. HIPAA compliant message left requesting a return phone call at patient convenience.     - Needs to schedule Psychiatry  - Needs to reschedule counseling at [facility]  - Sinus issues (pulm)  - R eye issues-  Neuro?  - [facility]- needs to schedule [phone]  - PCP referred patient to  Neuro and  Stroke Clinic. Patient may consider going here as her UMR insurance has termed.      Kaila Juares LPN- Care Coordinator  Associate Care Management  [address] [place] [address]  [address]  Phone: [phone]  [email or web address]

## 2024-03-18 NOTE — PROGRESS NOTES
Patient: Berta Oscar  : 1975    MRN: [id number]    Date of Visit: 3/14/24    Attending Physician: Chidi Peng    History of Present Illness  Ms.. Oscar is a very pleasant 48 y.o. patient with a several year history of progressive BIALTERAL knee pain. There is no precipitating event or trauma. The pain is located predominantly in the medial and anterior aspect of the knee aggravated by weight bearing. Walking even short distances can be painful. Stair climbing is progressing becoming more difficult and painful. However, it can also awaken the patient at night. She has tried the following interventions without sustained functional improvement:    Low-impact, structured therapy/exercise program  NSAID's/Tylenol Arthritis strength  Crtisone injections/viscosupplementation   Knee brace  Cane for ambulation    She has had extensive care under Dr. Richardson as well as most recently seen Dr. Avila at .  She has undergone extensive therapy as well as injections including gel injections and cortisone injections with only mild to moderate improvement she reports that Dr. Richardson had recommended a knee replacement at point.  She underwent an MRI several years ago.    PMH/PSH:  Past Medical History:   Diagnosis Date    ADHD (attention deficit hyperactivity disorder)     Allergic rhinitis     Anxiety     B12 deficiency     Chondromalacia     Chronic pain     Depression     Diabetes mellitus (HCC)     Fibromyalgia     Hypercholesterolemia     Hypertension     Insomnia     SANDY (obstructive sleep apnea)     Osteoarthritis of left knee     Peripheral neuropathy     PTSD (post-traumatic stress disorder)     Stress incontinence      Patient Active Problem List   Diagnosis    Dysarthria    Stuttering    Monocular diplopia    Unsteady gait    Dizziness     Past Surgical History:   Procedure Laterality Date    BUNIONECTOMY             MEDS:  Scheduled Meds:  Continuous Infusions:  PRN Meds:  Current

## 2024-03-19 ENCOUNTER — CARE COORDINATION (OUTPATIENT)
Dept: OTHER | Facility: CLINIC | Age: 49
End: 2024-03-19

## 2024-03-19 DIAGNOSIS — I48.91 ATRIAL FIBRILLATION, UNSPECIFIED TYPE (HCC): Primary | ICD-10-CM

## 2024-03-19 NOTE — CARE COORDINATION
MSW Documentation    MSW contacted patient for follow up.   Patient referred by Nurse NACHO, Kaila Juares LPN.        Purpose of TC  MSW outreach to follow up on previous discussion-patient resignation and admin leave: was patient paid their anticipated 80hrs of admin leave for dates February 26th 2024-March 30th 2024.        TC Details  MSW unable to reach, discreet voicemail left requesting return call.      Plan of action  MSW to await return call; MSW to attempt outreach again.

## 2024-03-20 ENCOUNTER — HOSPITAL ENCOUNTER (OUTPATIENT)
Dept: MRI IMAGING | Age: 49
Discharge: HOME | End: 2024-03-20
Payer: COMMERCIAL

## 2024-03-20 DIAGNOSIS — M25.561 RIGHT KNEE PAIN, UNSPECIFIED CHRONICITY: ICD-10-CM

## 2024-03-20 DIAGNOSIS — M25.562 LEFT KNEE PAIN, UNSPECIFIED CHRONICITY: ICD-10-CM

## 2024-03-20 PROCEDURE — 73721 MRI JNT OF LWR EXTRE W/O DYE: CPT

## 2024-03-21 ENCOUNTER — CARE COORDINATION (OUTPATIENT)
Dept: OTHER | Facility: CLINIC | Age: 49
End: 2024-03-21

## 2024-03-21 NOTE — CARE COORDINATION
3/21/24  Care Coordination  Note    Care Coordination  (CCSS) received referral from [facility] requesting assistance with Assistance with appointment scheduling or follow-up.   yes - Need to f/u on appointment with couselor .     CCSS contacted Provider office, [facility], via phone, verified name and  with Ny as identifiers.    Summary Note:   The patient was a no show on 3/6. The appt was rescheduled to 3/12 which was also a no show appt.  This CCSS advised that the patient stated that she received an email cancelling the appointment because the paperwork wasn't completed.  Ny stated that was possibly a misunderstanding as they didn't cancel the appt.  The email stated that the appt could be cancelled if the paperwork isn't completed.  Unfortunately, she still hasn't completed all of the paperwork.  If the pt reschedules, she can't schedule with the previous counselor, Katina Reveles, but she can schedule with another counselor in their office. In order to be seen by psychiatry, the paperwork has to be completed a week in advance.  Their paperwork is a little different.  CCSS followed up with [facility], Kaila Juares RN, who stated that there are no further needs from this CCSS at this time.     No further outreach scheduled with CCSS, CCSS will sign off care team at this time. Patient will be further outreached by the [facility] on the care team.      Plan:  Chart routed to [facility] for review.   CCSS No further follow-up call indicated

## 2024-03-21 NOTE — CARE COORDINATION
Ambulatory Care Coordination Note    ACM attempted to reach patient for follow up call regarding see below. HIPAA compliant message left requesting a return phone call at patient convenience.     - Needs to schedule Psychiatry  - Needs to reschedule counseling at [facility]  - Sinus issues (pulm)  - R eye issues-  Neuro?  - [facility]- needs to schedule [phone]  - PCP referred patient to  Neuro and  Stroke Clinic. Patient may consider going here as her UMR insurance has termed.      Kaila Juares LPN- Care Coordinator  Associate Care Management  [address] [place] [address]  [address]  Phone: [phone]  [email or web address]

## 2024-03-29 ENCOUNTER — CARE COORDINATION (OUTPATIENT)
Dept: OTHER | Facility: CLINIC | Age: 49
End: 2024-03-29

## 2024-03-29 NOTE — CARE COORDINATION
Ambulatory Care Coordination Note      No further outreach scheduled with this ACM, patient has been provided with this ACM's contact information.  ACM will sign off care team at this time.     Patient's [facility] insurance has termed as of 2/29/24.    No future appointments.    Kaila Juares LPN- Care Coordinator  Associate Care Management  [address] [place], [place]  [address]  Phone: [phone]  [email or web address]

## 2024-04-01 ENCOUNTER — CARE COORDINATION (OUTPATIENT)
Dept: OTHER | Facility: CLINIC | Age: 49
End: 2024-04-01

## 2024-04-01 NOTE — CARE COORDINATION
MSW Documentation    MSW contacted patient for follow up.  Referred by NACHO COVARRUBIAS, Kaila Juares.           TC Details  Unable to reach, x2 attempts. Patient insurance term at this time, patient no longer employed with [employer]. At last contact patient anticipation to receive paid administrative leave for 80 hours.       MSW provided contact information for future needs. No further follow-up call indicated

## 2024-04-15 ENCOUNTER — TELEPHONE (OUTPATIENT)
Dept: ORTHOPEDIC SURGERY | Age: 49
End: 2024-04-15

## 2024-04-15 NOTE — TELEPHONE ENCOUNTER
----- Message from Chidi Peng MD sent at 4/15/2024 12:38 PM EDT -----  Bring back to discuss MRI results

## 2024-05-09 ENCOUNTER — APPOINTMENT (OUTPATIENT)
Dept: GENERAL RADIOLOGY | Age: 49
End: 2024-05-09
Payer: COMMERCIAL

## 2024-05-09 ENCOUNTER — HOSPITAL ENCOUNTER (EMERGENCY)
Age: 49
Discharge: HOME | End: 2024-05-09
Payer: COMMERCIAL

## 2024-05-09 VITALS
TEMPERATURE: 98.2 F | BODY MASS INDEX: 37.89 KG/M2 | HEIGHT: 68 IN | DIASTOLIC BLOOD PRESSURE: 81 MMHG | SYSTOLIC BLOOD PRESSURE: 128 MMHG | RESPIRATION RATE: 15 BRPM | WEIGHT: 250 LBS | HEART RATE: 70 BPM | OXYGEN SATURATION: 93 %

## 2024-05-09 DIAGNOSIS — R42 LIGHTHEADEDNESS: Primary | ICD-10-CM

## 2024-05-09 DIAGNOSIS — R55 NEAR SYNCOPE: ICD-10-CM

## 2024-05-09 DIAGNOSIS — E86.1 HYPOVOLEMIA: ICD-10-CM

## 2024-05-09 LAB
ALBUMIN SERPL-MCNC: 4.2 G/DL (ref 3.4–5)
ALBUMIN/GLOB SERPL: 1.7 {RATIO} (ref 1.1–2.2)
ALP SERPL-CCNC: 99 U/L (ref 40–129)
ALT SERPL-CCNC: 20 U/L (ref 10–40)
ANION GAP SERPL CALCULATED.3IONS-SCNC: 11 MMOL/L (ref 3–16)
AST SERPL-CCNC: 17 U/L (ref 15–37)
BASOPHILS # BLD: 0.1 K/UL (ref 0–0.2)
BASOPHILS NFR BLD: 1.1 %
BILIRUB SERPL-MCNC: 1.1 MG/DL (ref 0–1)
BUN SERPL-MCNC: 18 MG/DL (ref 7–20)
CALCIUM SERPL-MCNC: 9.2 MG/DL (ref 8.3–10.6)
CHLORIDE SERPL-SCNC: 101 MMOL/L (ref 99–110)
CO2 SERPL-SCNC: 24 MMOL/L (ref 21–32)
CREAT SERPL-MCNC: 1 MG/DL (ref 0.6–1.1)
DEPRECATED RDW RBC AUTO: 14.4 % (ref 12.4–15.4)
EKG ATRIAL RATE: 82 BPM
EKG DIAGNOSIS: NORMAL
EKG P AXIS: 33 DEGREES
EKG P-R INTERVAL: 164 MS
EKG Q-T INTERVAL: 388 MS
EKG QRS DURATION: 86 MS
EKG QTC CALCULATION (BAZETT): 453 MS
EKG R AXIS: -30 DEGREES
EKG T AXIS: 29 DEGREES
EKG VENTRICULAR RATE: 82 BPM
EOSINOPHIL # BLD: 0.5 K/UL (ref 0–0.6)
EOSINOPHIL NFR BLD: 4.9 %
GFR SERPLBLD CREATININE-BSD FMLA CKD-EPI: 69 ML/MIN/{1.73_M2}
GLUCOSE SERPL-MCNC: 109 MG/DL (ref 70–99)
HCT VFR BLD AUTO: 41 % (ref 36–48)
HGB BLD-MCNC: 13.8 G/DL (ref 12–16)
LYMPHOCYTES # BLD: 2.7 K/UL (ref 1–5.1)
LYMPHOCYTES NFR BLD: 26.6 %
MCH RBC QN AUTO: 28.2 PG (ref 26–34)
MCHC RBC AUTO-ENTMCNC: 33.6 G/DL (ref 31–36)
MCV RBC AUTO: 83.9 FL (ref 80–100)
MONOCYTES # BLD: 0.6 K/UL (ref 0–1.3)
MONOCYTES NFR BLD: 6 %
NEUTROPHILS # BLD: 6.3 K/UL (ref 1.7–7.7)
NEUTROPHILS NFR BLD: 61.4 %
PLATELET # BLD AUTO: 232 K/UL (ref 135–450)
PMV BLD AUTO: 7.7 FL (ref 5–10.5)
POTASSIUM SERPL-SCNC: 3.8 MMOL/L (ref 3.5–5.1)
PROT SERPL-MCNC: 6.7 G/DL (ref 6.4–8.2)
RBC # BLD AUTO: 4.88 M/UL (ref 4–5.2)
SODIUM SERPL-SCNC: 136 MMOL/L (ref 136–145)
TROPONIN, HIGH SENSITIVITY: 10 NG/L (ref 0–14)
TROPONIN, HIGH SENSITIVITY: 8 NG/L (ref 0–14)
WBC # BLD AUTO: 10.3 K/UL (ref 4–11)

## 2024-05-09 PROCEDURE — 99285 EMERGENCY DEPT VISIT HI MDM: CPT

## 2024-05-09 PROCEDURE — 93010 ELECTROCARDIOGRAM REPORT: CPT | Performed by: INTERNAL MEDICINE

## 2024-05-09 PROCEDURE — 85025 COMPLETE CBC W/AUTO DIFF WBC: CPT

## 2024-05-09 PROCEDURE — 80053 COMPREHEN METABOLIC PANEL: CPT

## 2024-05-09 PROCEDURE — 93005 ELECTROCARDIOGRAM TRACING: CPT | Performed by: EMERGENCY MEDICINE

## 2024-05-09 PROCEDURE — 71046 X-RAY EXAM CHEST 2 VIEWS: CPT

## 2024-05-09 PROCEDURE — 96360 HYDRATION IV INFUSION INIT: CPT

## 2024-05-09 PROCEDURE — 36415 COLL VENOUS BLD VENIPUNCTURE: CPT

## 2024-05-09 PROCEDURE — 2580000003 HC RX 258: Performed by: PHYSICIAN ASSISTANT

## 2024-05-09 PROCEDURE — 84484 ASSAY OF TROPONIN QUANT: CPT

## 2024-05-09 RX ORDER — 0.9 % SODIUM CHLORIDE 0.9 %
1000 INTRAVENOUS SOLUTION INTRAVENOUS ONCE
Status: COMPLETED | OUTPATIENT
Start: 2024-05-09 | End: 2024-05-09

## 2024-05-09 RX ADMIN — SODIUM CHLORIDE 1000 ML: 9 INJECTION, SOLUTION INTRAVENOUS at 20:27

## 2024-05-09 ASSESSMENT — PAIN SCALES - GENERAL: PAINLEVEL_OUTOF10: 4

## 2024-05-09 ASSESSMENT — PAIN - FUNCTIONAL ASSESSMENT: PAIN_FUNCTIONAL_ASSESSMENT: 0-10

## 2024-05-09 ASSESSMENT — PAIN DESCRIPTION - LOCATION: LOCATION: CHEST

## 2024-05-10 NOTE — ED PROVIDER NOTES
I was not asked to see this patient.  I was available for consultation if deemed necessary.  I was only asked to interpret the EKG.  Please see ANTHONY Soto's note for care of the patient while in the emergency department and for final disposition.    The Ekg interpreted by me shows  normal sinus rhythm with a rate of 82  Axis is   Left axis deviation  QTc is  within an acceptable range  Intervals and Durations are unremarkable.      ST Segments: no acute change and nonspecific changes  No significant change from prior EKG dated - 2/11/24  No STEMI, inferior infarct today similar to old EKG           Shailesh Wan MD  05/10/24 1690    
Eosinophils Absolute 0.5 0.0 - 0.6 K/uL    Basophils Absolute 0.1 0.0 - 0.2 K/uL   Comprehensive Metabolic Panel w/ Reflex to MG   Result Value Ref Range    Sodium 136 136 - 145 mmol/L    Potassium reflex Magnesium 3.8 3.5 - 5.1 mmol/L    Chloride 101 99 - 110 mmol/L    CO2 24 21 - 32 mmol/L    Anion Gap 11 3 - 16    Glucose 109 (H) 70 - 99 mg/dL    BUN 18 7 - 20 mg/dL    Creatinine 1.0 0.6 - 1.1 mg/dL    Est, Glom Filt Rate 69 >60    Calcium 9.2 8.3 - 10.6 mg/dL    Total Protein 6.7 6.4 - 8.2 g/dL    Albumin 4.2 3.4 - 5.0 g/dL    Albumin/Globulin Ratio 1.7 1.1 - 2.2    Total Bilirubin 1.1 (H) 0.0 - 1.0 mg/dL    Alkaline Phosphatase 99 40 - 129 U/L    ALT 20 10 - 40 U/L    AST 17 15 - 37 U/L   Troponin   Result Value Ref Range    Troponin, High Sensitivity 10 0 - 14 ng/L   Troponin   Result Value Ref Range    Troponin, High Sensitivity 8 0 - 14 ng/L   EKG 12 Lead   Result Value Ref Range    Ventricular Rate 82 BPM    Atrial Rate 82 BPM    P-R Interval 164 ms    QRS Duration 86 ms    Q-T Interval 388 ms    QTc Calculation (Bazett) 453 ms    P Axis 33 degrees    R Axis -30 degrees    T Axis 29 degrees    Diagnosis       Normal sinus rhythmInferior infarct (cited on or before 09-MAY-2024)Cannot rule out Anterior infarct , age undeterminedAbnormal ECGWhen compared with ECG of 11-FEB-2024 11:06,No significant changeConfirmed by TARAH Bell ([id number]) on 5/9/2024 5:12:47 PM           RADIOLOGY:  All x-ray studies are viewed/reviewed by me.     Formal interpretations per the radiologist are as follows:      XR CHEST (2 VW)    Result Date: 5/9/2024  EXAMINATION: TWO XRAY VIEWS OF THE CHEST 5/9/2024 4:30 pm COMPARISON: None. HISTORY: ORDERING SYSTEM PROVIDED HISTORY: CP TECHNOLOGIST PROVIDED HISTORY: Reason for exam:->CP Reason for Exam: chest pain, dizziness FINDINGS: The lungs are without acute focal process.  There is a calcified granuloma in the the right lung base.  There is no effusion or pneumothorax. The

## 2024-06-13 ENCOUNTER — OFFICE VISIT (OUTPATIENT)
Dept: ORTHOPEDIC SURGERY | Age: 49
End: 2024-06-13
Payer: COMMERCIAL

## 2024-06-13 ENCOUNTER — TELEPHONE (OUTPATIENT)
Dept: ORTHOPEDIC SURGERY | Age: 49
End: 2024-06-13

## 2024-06-13 DIAGNOSIS — M25.562 LEFT KNEE PAIN, UNSPECIFIED CHRONICITY: ICD-10-CM

## 2024-06-13 DIAGNOSIS — M17.0 BILATERAL PRIMARY OSTEOARTHRITIS OF KNEE: Primary | ICD-10-CM

## 2024-06-13 PROCEDURE — 1036F TOBACCO NON-USER: CPT | Performed by: ORTHOPAEDIC SURGERY

## 2024-06-13 PROCEDURE — G8417 CALC BMI ABV UP PARAM F/U: HCPCS | Performed by: ORTHOPAEDIC SURGERY

## 2024-06-13 PROCEDURE — G8427 DOCREV CUR MEDS BY ELIG CLIN: HCPCS | Performed by: ORTHOPAEDIC SURGERY

## 2024-06-13 PROCEDURE — 99214 OFFICE O/P EST MOD 30 MIN: CPT | Performed by: ORTHOPAEDIC SURGERY

## 2024-06-13 NOTE — PROGRESS NOTES
Meds:  Current Outpatient Medications:     aspirin 81 MG chewable tablet, Take 1 tablet by mouth daily, Disp: 30 tablet, Rfl: 3    promethazine-dextromethorphan (PROMETHAZINE-DM) 6.25-15 MG/5ML syrup, Take 5 mLs by mouth 4 times daily as needed for Cough, Disp: , Rfl:     labetalol (NORMODYNE) 200 MG tablet, Take 1 tablet by mouth 2 times daily, Disp: , Rfl:     losartan (COZAAR) 50 MG tablet, Take 1 tablet by mouth daily, Disp: , Rfl:     empagliflozin (JARDIANCE) 25 MG tablet, Take 1 tablet by mouth daily, Disp: , Rfl:     Dulaglutide (TRULICITY) 4.5 MG/0.5ML SOPN, Inject 4.5 mg into the skin once a week, Disp: , Rfl:     traZODone (DESYREL) 100 MG tablet, Take 1 tablet by mouth nightly, Disp: , Rfl:     atorvastatin (LIPITOR) 20 MG tablet, Take 1 tablet by mouth daily, Disp: , Rfl:     buPROPion (WELLBUTRIN XL) 300 MG extended release tablet, Take 1 tablet by mouth daily, Disp: , Rfl:     clonazePAM (KLONOPIN) 1 MG tablet, Take 1 tablet by mouth 2 times daily as needed., Disp: , Rfl:     RESTASIS 0.05 % ophthalmic emulsion, Apply 1 drop to eye in the morning and 1 drop in the evening., Disp: , Rfl:     guanFACINE HCl ER (INTUNIV) 3 MG TB24 tablet, Take 1 tablet by mouth nightly, Disp: , Rfl:     hydrocortisone 2.5 % cream, Apply topically nightly, Disp: , Rfl:     metFORMIN (GLUCOPHAGE-XR) 500 MG extended release tablet, Take 1 tablet by mouth daily (with breakfast), Disp: , Rfl:     methocarbamol (ROBAXIN) 500 MG tablet, Take 1 tablet by mouth 3 times daily, Disp: , Rfl:     montelukast (SINGULAIR) 10 MG tablet, Take 1 tablet by mouth nightly, Disp: , Rfl:     mupirocin (BACTROBAN) 2 % ointment, Apply topically daily, Disp: , Rfl:     nystatin (MYCOSTATIN) 750033 UNIT/GM powder, Apply topically 3 times daily, Disp: , Rfl:     omeprazole (PRILOSEC) 40 MG delayed release capsule, Take 1 capsule by mouth every morning (before breakfast), Disp: , Rfl:     DULoxetine (CYMBALTA) 60 MG extended release capsule, Take 1

## 2024-06-26 ENCOUNTER — TELEPHONE (OUTPATIENT)
Dept: ORTHOPEDIC SURGERY | Age: 49
End: 2024-06-26

## 2024-07-18 ENCOUNTER — OFFICE VISIT (OUTPATIENT)
Dept: ORTHOPEDIC SURGERY | Age: 49
End: 2024-07-18
Payer: COMMERCIAL

## 2024-07-18 DIAGNOSIS — M17.0 BILATERAL PRIMARY OSTEOARTHRITIS OF KNEE: Primary | ICD-10-CM

## 2024-07-18 PROCEDURE — 20610 DRAIN/INJ JOINT/BURSA W/O US: CPT | Performed by: ORTHOPAEDIC SURGERY

## 2024-07-18 NOTE — PROGRESS NOTES
Joint Injection: risks and benefits were discussed with the patient, after preparation of the injection site with alcohol, HYALURONIC ACID (viscosup 16.8mg) was injected into the BILATERAL KNEE joint for arthritis. The procedure was tolerated well without adverse reaction. The patient was counseled on potential reactions to the injection and given information on follow up and when to seek medical attention. The patient will monitor how long relief persists.    1/3

## 2024-08-01 ENCOUNTER — OFFICE VISIT (OUTPATIENT)
Dept: ORTHOPEDIC SURGERY | Age: 49
End: 2024-08-01
Payer: COMMERCIAL

## 2024-08-01 DIAGNOSIS — M17.0 BILATERAL PRIMARY OSTEOARTHRITIS OF KNEE: Primary | ICD-10-CM

## 2024-08-01 PROCEDURE — 20610 DRAIN/INJ JOINT/BURSA W/O US: CPT | Performed by: ORTHOPAEDIC SURGERY

## 2024-08-01 NOTE — PROGRESS NOTES
Joint Injection: risks and benefits were discussed with the patient, after preparation of the injection site with alcohol, HYALURONIC ACID (viscosup 16.8mg) was injected into the BILATERAL KNEE joint for arthritis. The procedure was tolerated well without adverse reaction. The patient was counseled on potential reactions to the injection and given information on follow up and when to seek medical attention. The patient will monitor how long relief persists.    2/3

## 2024-08-15 ENCOUNTER — OFFICE VISIT (OUTPATIENT)
Dept: ORTHOPEDIC SURGERY | Age: 49
End: 2024-08-15

## 2024-08-15 VITALS — WEIGHT: 250 LBS | HEIGHT: 68 IN | BODY MASS INDEX: 37.89 KG/M2

## 2024-08-15 DIAGNOSIS — M17.0 BILATERAL PRIMARY OSTEOARTHRITIS OF KNEE: Primary | ICD-10-CM

## 2024-08-15 NOTE — PROGRESS NOTES
Joint Injection: risks and benefits were discussed with the patient, after preparation of the injection site with alcohol, HYALURONIC ACID (viscosup 16.8mg) was injected into the BILATERAL KNEE joint for arthritis. The procedure was tolerated well without adverse reaction. The patient was counseled on potential reactions to the injection and given information on follow up and when to seek medical attention. The patient will monitor how long relief persists.    3/3